# Patient Record
Sex: MALE | Employment: OTHER | URBAN - METROPOLITAN AREA
[De-identification: names, ages, dates, MRNs, and addresses within clinical notes are randomized per-mention and may not be internally consistent; named-entity substitution may affect disease eponyms.]

---

## 2019-06-11 ENCOUNTER — COMMUNICATION - HEALTHEAST (OUTPATIENT)
Dept: HEALTH INFORMATION MANAGEMENT | Facility: CLINIC | Age: 69
End: 2019-06-11

## 2021-06-19 NOTE — LETTER
Letter by Paddy Ugarte at      Author: Padyd Ugarte Service: -- Author Type: --    Filed:  Encounter Date: 2019 Status: (Other)         2019       Justin Christian Ramona  [address] [facility] [place] [address]    Dear Justin Greene:    We are pleased to provide you with secure, online access to medical information for you and your family. Per your request, we have expanded your account to allow access to the records of the following family members:              Jose J GENAO Ramona (privilege ends on 2026.)     How Do I Login?  1. In your Internet browser, go to [email or web address]  2. Click on Sign Up Now   3. Enter your Bia Activation Code exactly as it appears below. This code will  14 days after it is generated. You will not need to use this code after you have completed the sign-up process. If you do not sign up before the expiration date, you must request a new code.     Bia Activation Code: [email or web address]  Expires: 8/10/2019  3:58 PM    4. Enter in your date of birth and zip code.  5. Create a Bia username. Think of one that is secure and easy to remember.  Your username must be between 6 and 20 characters.  6. Create a Bia password. You can change your password at any time. Your password must be between 8 and 45 characters, contain at least two letters and one number, and contain both upper and lower case letters.  7. Choose a security question, enter your answer, and click Next. This can be used to access Bia if you forget your password.   8. Enter a valid e-mail address to receive e-mail notifications when new information is available in Bia.  9. Click Sign In.        How Do I Access a Family Member's Account?  10. Select the account you want to access by clicking the [place] with the appropriate patient's name at the top of your screen.   11. You will see a disclaimer page letting you know that you will be viewing a family member's  record. Review the disclaimer and then click Accept Proxy Access Disclaimer to proceed.  12. Once you switch to viewing a family member's record, you can navigate to [employer] pages the same way you would for yourself. You can return to your own account by clicking the [place] at the top of the screen with your name on it.    13. To customize colors and names of the linked accounts, you can select Personalize from the Profile dropdown menu at the top of the screen, then click the Edit button to make changes.      Additional Information  If you have questions, you can e-mail [email or web address] or call [phone] to talk to our [facility] [employer] staff. Remember, [employer] is NOT to be used for urgent needs. For medical emergencies, dial 911.

## 2022-08-05 ENCOUNTER — TRANSFERRED RECORDS (OUTPATIENT)
Dept: HEALTH INFORMATION MANAGEMENT | Facility: CLINIC | Age: 72
End: 2022-08-05

## 2022-08-06 ENCOUNTER — TRANSFERRED RECORDS (OUTPATIENT)
Dept: HEALTH INFORMATION MANAGEMENT | Facility: CLINIC | Age: 72
End: 2022-08-06

## 2022-08-07 ENCOUNTER — TRANSFERRED RECORDS (OUTPATIENT)
Dept: HEALTH INFORMATION MANAGEMENT | Facility: CLINIC | Age: 72
End: 2022-08-07

## 2022-08-11 ENCOUNTER — TRANSFERRED RECORDS (OUTPATIENT)
Dept: HEALTH INFORMATION MANAGEMENT | Facility: CLINIC | Age: 72
End: 2022-08-11

## 2022-09-21 ENCOUNTER — TELEPHONE (OUTPATIENT)
Dept: CARDIOLOGY | Facility: CLINIC | Age: 72
End: 2022-09-21

## 2022-09-21 NOTE — TELEPHONE ENCOUNTER
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  Please call Dr Rahumehra back with an answer from Dr Sam     Action Taken: Other: Cardiology    Travel Screening: Not Applicable

## 2022-09-23 NOTE — TELEPHONE ENCOUNTER
[facility]    Phone Message    May a detailed message be left on voicemail: yes     Reason for Call: Other: Dr. Malcolm calling back to check in on his message from 9/21. Please call him back with an update as soon as possible, thanks!    Action Taken: Other: Cardiology    Travel Screening: Not Applicable

## 2022-09-29 NOTE — TELEPHONE ENCOUNTER
[facility]  Caller: Unspecified (1 week ago)  Hi All,     A quick update     Patient called back stating he will be out of town on 10/12 and unable to make that appointment. Patient has been worked in to Dr. Sam's Research day on 10/27 at 3:50pm.     Thanks!   [place]

## 2022-10-27 ENCOUNTER — OFFICE VISIT (OUTPATIENT)
Dept: CARDIOLOGY | Facility: CLINIC | Age: 72
End: 2022-10-27
Payer: COMMERCIAL

## 2022-10-27 VITALS
WEIGHT: 189.4 LBS | HEART RATE: 61 BPM | DIASTOLIC BLOOD PRESSURE: 68 MMHG | HEIGHT: 70 IN | SYSTOLIC BLOOD PRESSURE: 108 MMHG | RESPIRATION RATE: 16 BRPM | BODY MASS INDEX: 27.11 KG/M2

## 2022-10-27 DIAGNOSIS — G47.33 MODERATE OBSTRUCTIVE SLEEP APNEA: ICD-10-CM

## 2022-10-27 DIAGNOSIS — I44.2 COMPLETE HEART BLOCK (H): ICD-10-CM

## 2022-10-27 DIAGNOSIS — I48.0 PAROXYSMAL ATRIAL FIBRILLATION (H): ICD-10-CM

## 2022-10-27 PROBLEM — I25.10 CORONARY ARTERY CALCIFICATION: Status: ACTIVE | Noted: 2022-06-29

## 2022-10-27 PROBLEM — A69.20 DISSEMINATED LYME DISEASE: Status: ACTIVE | Noted: 2022-08-07

## 2022-10-27 PROBLEM — I48.91 ATRIAL FIBRILLATION WITH SLOW VENTRICULAR RESPONSE (H): Status: ACTIVE | Noted: 2021-06-28

## 2022-10-27 PROBLEM — E78.49 OTHER HYPERLIPIDEMIA: Status: ACTIVE | Noted: 2022-10-27

## 2022-10-27 PROCEDURE — 99205 OFFICE O/P NEW HI 60 MIN: CPT | Performed by: INTERNAL MEDICINE

## 2022-10-27 RX ORDER — ERGOCALCIFEROL 1.25 MG/1
50000 CAPSULE, LIQUID FILLED ORAL WEEKLY
COMMUNITY
End: 2023-02-15

## 2022-10-27 RX ORDER — ATORVASTATIN CALCIUM 40 MG/1
40 TABLET, FILM COATED ORAL DAILY
COMMUNITY
Start: 2022-03-18

## 2022-10-27 NOTE — LETTER
10/27/2022    Yasmani Martino  [facility] [address] [facility] [address]    RE: Justin Greene       Dear Colleague,     I had the pleasure of seeing Justin Greene in the [facility].    [facility]  Cardiac Electrophysiology     Consultation Note: Robel Sam MD    Primary Care: Yasmani Martino MD      Thank you, Dr. Martino, for asking the [facility] Cardiac Arrhythmia care team to see Mr. Justin Greene to evaluate treatment options for paroxysmal atrial fibrillation.    Assessment/Recommendations   Assessment:      Paroxysmal atrial fibrillation: The patient clearly has symptoms associated with his atrial arrhythmia.  He has had improvement in sinus rhythm but probably has some early sinus node dysfunction and tachycardia-bradycardia syndrome based on his response to sotalol therapy and its withdrawal.  Its difficult to know whether his regularized bradycardia and atrial fibrillation in early August was secondary to Lyme's disease or in combination with his antiarrhythmic drug therapy or possible intrinsic conduction system disease.  Nonetheless this appears to have resolved and his heart rate response to activity has normalized (off antiarrhythmic drug therapy).  We discussed the underlying pathophysiology of his arrhythmia condition as well as treatment options including medical therapy and mapping/ablation.  I do not think he is a good candidate for daily antiarrhythmic drug therapy, but he would be a potential candidate for a pill in the pocket approach to treating episodes of recurrent atrial fibrillation.  This approach would permit us to better understand the frequency of his arrhythmia recurrence and permit further discussion about the alternative treatment options such as mapping and ablation.  The patient has had a recent stress test which showed no evidence of significant coronary ischemia and he would be a candidate for  "utilizing flecainide in combination with very low-dose beta-blocker.  The patient is wife will consider this potential approach as well as decide whether or not the wish to pursue arrhythmia care through the [facility] system or return to the [facility].    Obstructive sleep apnea: Chronic diagnosis for the patient and he is compliant with the CPAP therapy.  Plan:    I have asked the patient to check his rhythm status using his Apple Watch each morning.  In addition he can check if he feels that his heart rate response or symptoms warrant further assessment.    The patient will be contacted by the EP nurse clinician next week to see how he would like to proceed with his arrhythmia management.  If he does wish to follow-up with the [facility] system then I would start him on a pill in the pocket utilizing flecainide 50 mg at onset and metoprolol tartrate 12.5 mg.  This could be repeated at 4 hours if his episodes persist.    If the patient does wish to follow-up with [facility] then I would asked that he be seen in the arrhythmia clinic by the EP [facility] in 3 months.                  History of Present Illness/Subjective      Mr. Justin Greene is a 72 year old male with documented atrial fibrillation.  The patient symptoms date to July 2021 and he did have a cardioversion in August 2021.  Subsequently this last summer he began to \"struggle with stamina\" after a illness in late July which he thinks may have been secondary to a tick bite earlier in the month.  His symptoms progressed and he was hospitalized in early August with atrial fibrillation and a regularized ventricular response consistent with possible heart block.  At that time the patient was treated for Lyme's disease with doxycycline (of note the patient was on sotalol at that time) and following the initiation of antibiotic treatment and stopping sotalol the patient's AV conduction returned and he spontaneously converted back to sinus rhythm " approximately 3 days later.  Since that time he has been off membrane active antiarrhythmic drug therapy and notes that overall his energy is improved and that his heart rate response to activities likewise has improved with exercise rates in the 130-140 bpm range.  He reports no exertional chest pain or pressure.  He is not experiencing any orthopnea, PND or ankle edema.  He has been diagnosed with obstructive sleep apnea and is compliant with his CPAP therapy..      ECG:   Reported in Care Everywhere  Sinus bam 55 bpm.    ECHO:   Dated: 8/6/2022 ([facility])  1. Bradyarrhythmia during study.     2. The left ventricular size is normal.  Systolic function is normal.     The   estimated ejection fraction is 65%.  Wall thickness is borderline   increased.   Left ventricular segmental wall motion is grossly normal, however   endocardial   definition is limited.     3. The right ventricular size is borderline mildly enlarged.  Systolic   function is normal.     4. The left atrium is mildly enlarged.     5. The right atrium is mildly enlarged.     6. There is mild mitral valve regurgitation.     7. There is mild tricuspid valve regurgitation.     8. There is trace pulmonic regurgitation.     9. Aorta is normal in dimension proximally.     10. There is no gross pericardial effusion.       Other Studies:   Nuclear Stress Imaging study ([facility]) 7/20/2021  Summary     1. Treadmill Myocard Perf Multiple SPECT w/ or w/o wall motion \T\ EF   [[id number]].     2. Resting SPECT images and Stress Gated SPECT images obtained post   Sestamibi injection.     3. The patient exercised for 11 minutes and 0 seconds on the  [place]   protocol.     4. The patient experienced no symptoms during the stress test.     5. Excellent functional capacity for age.     6. Normal Hemodynamic response to stress.     7. Occasional PVCs during stress and rare Couplets noted.     8. Negative stress ECG for significant ST segment depression.  "    9. Stress and rest images show count reduction inferiorly consistent with   diaphragmatic attenuation.     10. Perfusion images reveal no significant territory of myocardial   infarct   or ischemia.     11. Normal left ventricular systolic function. Calculated LVEF 65 %.     Time spent: 65 minutes spent on the date of the encounter doing chart review, history and exam, documentation and further activities as noted above.       Physical Examination Review of Systems   /68 (BP Location: Right arm, Patient Position: Sitting, Cuff Size: Adult Regular)   Pulse 61   Resp 16   Ht 1.778 m (5' 10\")   Wt 85.9 kg (189 lb 6.4 oz)   BMI 27.18 kg/m    Body mass index is 27.18 kg/m .  Wt Readings from Last 3 Encounters:   10/27/22 85.9 kg (189 lb 6.4 oz)     [unfilled]    General     Appearance:   The patient is alert oriented to person place and situation.    The patient is in no acute distress at the time of my evaluation.   HEENT:  Pupils are equal, round, and reactive to light.  Conjunctiva and sclera are clear.  ENT: Oral mucosa is moist and without redness. No evident nasal discharge.   Neck: Without palpable thyroid or appreciable lymph nodes.   Chest: Symmetric, without deformity.   Lungs:   Clear bilaterally with no rales, rhonchi, or wheezes.     Cardiovascular:   Rhythm is regular. S1 and S2 are normal. No significant murmur is present. JVP is normal. Lower extremities demonstrate no significant edema. Distal pulses are intact bilaterally.   Abdomen:  Soft, non-tender, and without hepatosplenomegaly. Bowel sounds present.   Extremities: Without deformity.   Skin: Skin is warm, dry, and otherwise intact.   Neurologic: Gait is normal.           A 12 point comprehensive review of systems was negative except as noted.      Medical History  Surgical History Family History Social History   Past Medical History:   Diagnosis Date     Dyslipidemia     Created by Conversion      Moderate obstructive sleep apnea " 10/30/2013    PSG: 10/29/13 - RDI 24.7, AHI 18.8, REM RDI 19.8, and low oxygen 83%. Titration: 6/2/16 - Set 8 cmH20 optimal. 9/8/20: AHI at goal at 1.3. Good compliance. Set 10 cmH20.     Paroxysmal atrial fibrillation (H)     Past Surgical History:   Procedure Laterality Date     SPINE SURGERY N/A     Family History   Problem Relation Age of Onset     Lung Cancer Mother      Cerebrovascular Disease Father      Myocardial Infarction Father      Prostate Cancer Father      Thyroid Cancer Sister      No Known Problems Sister      Sudden Death Brother      Hypertension Brother      Congenital heart disease Brother      Coronary Artery Disease Brother      Prader-Willi syndrome Brother      No Known Problems Brother     Social History     Socioeconomic History     Marital status:      Spouse name: Not on file     Number of children: Not on file     Years of education: Not on file     Highest education level: Not on file   Occupational History     Not on file   Tobacco Use     Smoking status: Never     Smokeless tobacco: Never   Substance and Sexual Activity     Alcohol use: Never     Drug use: Never     Sexual activity: Not on file   Other Topics Concern     Not on file   Social History Narrative     Not on file     Social Determinants of Health     Financial Resource Strain: Not on file   Food Insecurity: Not on file   Transportation Needs: Not on file   Physical Activity: Not on file   Stress: Not on file   Social Connections: Not on file   Intimate Partner Violence: Not on file   Housing Stability: Not on file          Medications  Allergies   Scheduled Meds:  Current Outpatient Medications   Medication Sig Dispense Refill     atorvastatin (LIPITOR) 40 MG tablet Take 40 mg by mouth daily       cholecalciferol 50 MCG (2000 UT) tablet Take 4,000 Units by mouth       Melatonin-Pyridoxine (MELATONIN-B6 OR)        rivaroxaban ANTICOAGULANT (XARELTO) 20 MG TABS tablet Take 20 mg by mouth daily       vitamin D2  (ERGOCALCIFEROL) 14452 units (1250 mcg) capsule Take 50,000 Units by mouth once a week      No Known Allergies      Lab Results    Chemistry/lipid CBC Cardiac Enzymes/BNP/TSH/INR   No results found for: CHOL, HDL, TRIG, CHOLHDL, CREATININE, BUN, NA, CO2 No results found for: WBC, HGB, HCT, MCV, PLT No results found for: CKTOTAL, CKMB, TROPONINI, BNP, TSH, INR      Robel Sam MD  Clinical Cardiac Electrophysiologist  [facility]      Thank you for allowing me to participate in the care of your patient.      Sincerely,     Robel Sam MD     [facility]  cc:   No referring provider defined for this encounter.

## 2022-10-28 PROBLEM — I48.0 PAROXYSMAL ATRIAL FIBRILLATION (H): Status: ACTIVE | Noted: 2021-06-28

## 2022-10-28 NOTE — PROGRESS NOTES
[facility]  Cardiac Electrophysiology     Consultation Note: Robel Sam MD    Primary Care: Yasmani Martino MD      Thank you, Dr. Martino, for asking the [facility] Cardiac Arrhythmia care team to see Mr. Justin Greene to evaluate treatment options for paroxysmal atrial fibrillation.    Assessment/Recommendations   Assessment:      Paroxysmal atrial fibrillation: The patient clearly has symptoms associated with his atrial arrhythmia.  He has had improvement in sinus rhythm but probably has some early sinus node dysfunction and tachycardia-bradycardia syndrome based on his response to sotalol therapy and its withdrawal.  Its difficult to know whether his regularized bradycardia and atrial fibrillation in early August was secondary to Lyme's disease or in combination with his antiarrhythmic drug therapy or possible intrinsic conduction system disease.  Nonetheless this appears to have resolved and his heart rate response to activity has normalized (off antiarrhythmic drug therapy).  We discussed the underlying pathophysiology of his arrhythmia condition as well as treatment options including medical therapy and mapping/ablation.  I do not think he is a good candidate for daily antiarrhythmic drug therapy, but he would be a potential candidate for a pill in the pocket approach to treating episodes of recurrent atrial fibrillation.  This approach would permit us to better understand the frequency of his arrhythmia recurrence and permit further discussion about the alternative treatment options such as mapping and ablation.  The patient has had a recent stress test which showed no evidence of significant coronary ischemia and he would be a candidate for utilizing flecainide in combination with very low-dose beta-blocker.  The patient is wife will consider this potential approach as well as decide whether or not the wish to pursue arrhythmia care through the [place] system or return  "to the [facility] system.    Obstructive sleep apnea: Chronic diagnosis for the patient and he is compliant with the CPAP therapy.  Plan:    I have asked the patient to check his rhythm status using his Apple Watch each morning.  In addition he can check if he feels that his heart rate response or symptoms warrant further assessment.    The patient will be contacted by the EP nurse clinician next week to see how he would like to proceed with his arrhythmia management.  If he does wish to follow-up with the [facility] system then I would start him on a pill in the pocket utilizing flecainide 50 mg at onset and metoprolol tartrate 12.5 mg.  This could be repeated at 4 hours if his episodes persist.    If the patient does wish to follow-up with [facility] then I would asked that he be seen in the arrhythmia clinic by the EP ENOCH in 3 months.                  History of Present Illness/Subjective      Mr. Justin Greene is a 72 year old male with documented atrial fibrillation.  The patient symptoms date to July 2021 and he did have a cardioversion in August 2021.  Subsequently this last summer he began to \"struggle with stamina\" after a illness in late July which he thinks may have been secondary to a tick bite earlier in the month.  His symptoms progressed and he was hospitalized in early August with atrial fibrillation and a regularized ventricular response consistent with possible heart block.  At that time the patient was treated for Lyme's disease with doxycycline (of note the patient was on sotalol at that time) and following the initiation of antibiotic treatment and stopping sotalol the patient's AV conduction returned and he spontaneously converted back to sinus rhythm approximately 3 days later.  Since that time he has been off membrane active antiarrhythmic drug therapy and notes that overall his energy is improved and that his heart rate response to activities likewise has improved with exercise " rates in the 130-140 bpm range.  He reports no exertional chest pain or pressure.  He is not experiencing any orthopnea, PND or ankle edema.  He has been diagnosed with obstructive sleep apnea and is compliant with his CPAP therapy..      ECG:   Reported in Care Everywhere  Sinus bam 55 bpm.    ECHO:   Dated: 8/6/2022 ([facility])  1. Bradyarrhythmia during study.     2. The left ventricular size is normal.  Systolic function is normal.     The   estimated ejection fraction is 65%.  Wall thickness is borderline   increased.   Left ventricular segmental wall motion is grossly normal, however   endocardial   definition is limited.     3. The right ventricular size is borderline mildly enlarged.  Systolic   function is normal.     4. The left atrium is mildly enlarged.     5. The right atrium is mildly enlarged.     6. There is mild mitral valve regurgitation.     7. There is mild tricuspid valve regurgitation.     8. There is trace pulmonic regurgitation.     9. Aorta is normal in dimension proximally.     10. There is no gross pericardial effusion.       Other Studies:   Nuclear Stress Imaging study ([facility]) 7/20/2021  Summary     1. Treadmill Myocard Perf Multiple SPECT w/ or w/o wall motion \T\ EF   [[id number]].     2. Resting SPECT images and Stress Gated SPECT images obtained post   Sestamibi injection.     3. The patient exercised for 11 minutes and 0 seconds on the  [facility]   protocol.     4. The patient experienced no symptoms during the stress test.     5. Excellent functional capacity for age.     6. Normal Hemodynamic response to stress.     7. Occasional PVCs during stress and rare Couplets noted.     8. Negative stress ECG for significant ST segment depression.     9. Stress and rest images show count reduction inferiorly consistent with   diaphragmatic attenuation.     10. Perfusion images reveal no significant territory of myocardial   infarct   or ischemia.     11. Normal left ventricular  "systolic function. Calculated LVEF 65 %.     Time spent: 65 minutes spent on the date of the encounter doing chart review, history and exam, documentation and further activities as noted above.       Physical Examination Review of Systems   /68 (BP Location: Right arm, Patient Position: Sitting, Cuff Size: Adult Regular)   Pulse 61   Resp 16   Ht 1.778 m (5' 10\")   Wt 85.9 kg (189 lb 6.4 oz)   BMI 27.18 kg/m    Body mass index is 27.18 kg/m .  Wt Readings from Last 3 Encounters:   10/27/22 85.9 kg (189 lb 6.4 oz)     [unfilled]    General     Appearance:   The patient is alert oriented to person place and situation.    The patient is in no acute distress at the time of my evaluation.   HEENT:  Pupils are equal, round, and reactive to light.  Conjunctiva and sclera are clear.  ENT: Oral mucosa is moist and without redness. No evident nasal discharge.   Neck: Without palpable thyroid or appreciable lymph nodes.   Chest: Symmetric, without deformity.   Lungs:   Clear bilaterally with no rales, rhonchi, or wheezes.     Cardiovascular:   Rhythm is regular. S1 and S2 are normal. No significant murmur is present. JVP is normal. Lower extremities demonstrate no significant edema. Distal pulses are intact bilaterally.   Abdomen:  Soft, non-tender, and without hepatosplenomegaly. Bowel sounds present.   Extremities: Without deformity.   Skin: Skin is warm, dry, and otherwise intact.   Neurologic: Gait is normal.           A 12 point comprehensive review of systems was negative except as noted.      Medical History  Surgical History Family History Social History   Past Medical History:   Diagnosis Date     Dyslipidemia     Created by Conversion      Moderate obstructive sleep apnea 10/30/2013    PSG: 10/29/13 - RDI 24.7, AHI 18.8, REM RDI 19.8, and low oxygen 83%. Titration: 6/2/16 - Set 8 cmH20 optimal. 9/8/20: AHI at goal at 1.3. Good compliance. Set 10 cmH20.     Paroxysmal atrial fibrillation (H)     Past " Surgical History:   Procedure Laterality Date     SPINE SURGERY N/A     Family History   Problem Relation Age of Onset     Lung Cancer Mother      Cerebrovascular Disease Father      Myocardial Infarction Father      Prostate Cancer Father      Thyroid Cancer Sister      No Known Problems Sister      Sudden Death Brother      Hypertension Brother      Congenital heart disease Brother      Coronary Artery Disease Brother      Prader-Willi syndrome Brother      No Known Problems Brother     Social History     Socioeconomic History     Marital status:      Spouse name: Not on file     Number of children: Not on file     Years of education: Not on file     Highest education level: Not on file   Occupational History     Not on file   Tobacco Use     Smoking status: Never     Smokeless tobacco: Never   Substance and Sexual Activity     Alcohol use: Never     Drug use: Never     Sexual activity: Not on file   Other Topics Concern     Not on file   Social History Narrative     Not on file     Social Determinants of Health     Financial Resource Strain: Not on file   Food Insecurity: Not on file   Transportation Needs: Not on file   Physical Activity: Not on file   Stress: Not on file   Social Connections: Not on file   Intimate Partner Violence: Not on file   Housing Stability: Not on file          Medications  Allergies   Scheduled Meds:  Current Outpatient Medications   Medication Sig Dispense Refill     atorvastatin (LIPITOR) 40 MG tablet Take 40 mg by mouth daily       cholecalciferol 50 MCG (2000 UT) tablet Take 4,000 Units by mouth       Melatonin-Pyridoxine (MELATONIN-B6 OR)        rivaroxaban ANTICOAGULANT (XARELTO) 20 MG TABS tablet Take 20 mg by mouth daily       vitamin D2 (ERGOCALCIFEROL) 47517 units (1250 mcg) capsule Take 50,000 Units by mouth once a week      No Known Allergies      Lab Results    Chemistry/lipid CBC Cardiac Enzymes/BNP/TSH/INR   No results found for: CHOL, HDL, TRIG, CHOLHDL,  CREATININE, BUN, NA, CO2 No results found for: WBC, HGB, HCT, MCV, PLT No results found for: CKTOTAL, CKMB, TROPONINI, BNP, TSH, INR      Robel Sam MD  Clinical Cardiac Electrophysiologist  [facility]

## 2022-11-01 ENCOUNTER — TELEPHONE (OUTPATIENT)
Dept: CARDIOLOGY | Facility: CLINIC | Age: 72
End: 2022-11-01

## 2022-11-01 DIAGNOSIS — I48.0 PAROXYSMAL ATRIAL FIBRILLATION (H): Primary | ICD-10-CM

## 2022-11-01 NOTE — TELEPHONE ENCOUNTER
1st Attempt to contact pt, voicemail message was left with contact information and instructing pt to call back.  11/1/2022 8:46 AM  Merlyn Brunson RN        ----- Message from Robel Sam MD sent at 10/31/2022  7:06 PM CDT -----  Hi team,  I apologize if this is a duplicate message.  Please see my note from last Thursday.  Can 1 of you please contact the patient this week to see if he wishes to move forward with follow-up with our clinic.  If yes, then we can start him on the flecainide + metoprolol as outlined in my note.  He would then follow-up with the EP [facility] as outlined.  Thanks  Robel MATOS with Dr Sam 10/27:  Plan:    I have asked the patient to check his rhythm status using his Apple Watch each morning.  In addition he can check if he feels that his heart rate response or symptoms warrant further assessment.    The patient will be contacted by the EP nurse clinician next week to see how he would like to proceed with his arrhythmia management.  If he does wish to follow-up with the [facility] system then I would start him on a pill in the pocket utilizing flecainide 50 mg at onset and metoprolol tartrate 12.5 mg.  This could be repeated at 4 hours if his episodes persist.    If the patient does wish to follow-up with [facility] then I would asked that he be seen in the arrhythmia clinic by the EP ENOCH in 3 months.

## 2022-11-04 RX ORDER — FLECAINIDE ACETATE 50 MG/1
50 TABLET ORAL PRN
Qty: 30 TABLET | Refills: 3 | Status: SHIPPED | OUTPATIENT
Start: 2022-11-04 | End: 2023-03-14

## 2022-11-04 RX ORDER — METOPROLOL TARTRATE 25 MG/1
12.5 TABLET, FILM COATED ORAL PRN
Qty: 30 TABLET | Refills: 3 | Status: SHIPPED | OUTPATIENT
Start: 2022-11-04 | End: 2023-03-09

## 2022-11-04 NOTE — TELEPHONE ENCOUNTER
PC back from pt  Pt states that he wants to follow-up with our EP group  He understands and agreed to the medication instructions below, and follow-up  I did review with pt to keep track of AF episodes, to call if increase in frequency or duration, or if pt is in AF >6-8 hrs after taking 2 doses of medication  Pt verbalized understanding, has no further questions or concerns at this time, and has our contact information if needed.  11/4/2022 10:38 AM  Merlyn Brunson RN

## 2022-11-04 NOTE — TELEPHONE ENCOUNTER
2nd Attempt to contact pt, voicemail message was left with contact information and instructing pt to call back.  11/4/2022 10:29 AM  Merlyn Cisneros RN

## 2023-02-11 ENCOUNTER — HEALTH MAINTENANCE LETTER (OUTPATIENT)
Age: 73
End: 2023-02-11

## 2023-02-15 ENCOUNTER — OFFICE VISIT (OUTPATIENT)
Dept: CARDIOLOGY | Facility: CLINIC | Age: 73
End: 2023-02-15
Attending: NURSE PRACTITIONER
Payer: COMMERCIAL

## 2023-02-15 VITALS
WEIGHT: 196 LBS | BODY MASS INDEX: 28.06 KG/M2 | SYSTOLIC BLOOD PRESSURE: 124 MMHG | HEART RATE: 57 BPM | HEIGHT: 70 IN | DIASTOLIC BLOOD PRESSURE: 82 MMHG | RESPIRATION RATE: 16 BRPM

## 2023-02-15 DIAGNOSIS — I44.2 COMPLETE HEART BLOCK (H): ICD-10-CM

## 2023-02-15 DIAGNOSIS — I48.0 PAROXYSMAL ATRIAL FIBRILLATION (H): Primary | ICD-10-CM

## 2023-02-15 DIAGNOSIS — G47.33 MODERATE OBSTRUCTIVE SLEEP APNEA: ICD-10-CM

## 2023-02-15 DIAGNOSIS — I25.10 CORONARY ARTERY CALCIFICATION: ICD-10-CM

## 2023-02-15 PROCEDURE — 99215 OFFICE O/P EST HI 40 MIN: CPT | Performed by: NURSE PRACTITIONER

## 2023-02-15 RX ORDER — ASPIRIN 81 MG/1
81 TABLET, CHEWABLE ORAL DAILY
COMMUNITY
End: 2023-03-09

## 2023-02-15 RX ORDER — MULTIVIT WITH MINERALS/LUTEIN
1000 TABLET ORAL DAILY
COMMUNITY

## 2023-02-15 NOTE — PROGRESS NOTES
HEART CARE ELECTROPHYSIOLOGY NOTE      [facility]  [phone]      Assessment/Recommendations   Assessment/Plan:  1.  Persistent Atrial Fibrillation: Initial episode of A-fib in 2021 required cardioversion, though subsequent episodes have spontaneously converted back to sinus rhythm.  Failed antiarrhythmic therapy with low-dose sotalol due to symptomatic bradycardia and AV block.  Due to degree of sinus node dysfunction and possible history of high-grade AV block, he is not a good candidate for daily membrane active antiarrhythmic medications.  We further discussed pulmonary vein isolation ablation utilizing radiofrequency or cryo and <1 to 2% risk for complication.  He was given information to review at home.  We will continue to use flecainide pill in the pocket until he has more frequent occurrences of AF, then recommend proceeding with ablation.  Take flecainide 50 mg with metoprolol tartrate 12.5 mg at onset of A-fib.  May repeat in 4 hours if needed.  He is to call if episodes of AF increase in frequency or duration.    He was reassured that atrial fibrillation is not life-threatening, but carries an increased risk for stroke.  He has a [id number] score of 1-2 for age 65-74 and coronary artery calcification seen on CT, though negative stress test.  We discussed risk for stroke versus risk for bleed on OAC.  As he is very symptomatic, monitors his rhythm regularly, and episodes are infrequent, he will remain on aspirin 81 mg daily.  He was instructed to restart Xarelto if AF lasts longer than 6 to 12 hours.  We also discussed need for anticoagulation before and after ablation.    2.  Coronary artery calcification seen on CT: Stress test in 2021 was negative for ischemia.  He exercises regularly with interval cycling without difficulty.  Denies anginal symptoms.  Continue statin.    3.  Obstructive sleep apnea: Consistent use of CPAP nightly.  Discussed correlation between untreated  sleep apnea and atrial arrhythmias.  He was encouraged to maintain compliance with therapy.    Follow up in 1 year     History of Present Illness/Subjective    HPI: Justin Greene is a 72 year old male who comes in today accompanied by his wife for EP follow-up of atrial fibrillation.  He has a history of atrial fibrillation, sinus node dysfunction, hyperlipidemia, and LISET on CPAP.  Chest CT from 2016 notes coronary artery calcifications, stress test done in 2021 was negative for ischemia.    He was diagnosed with atrial fibrillation in the summer 2021 undergoing cardioversion in August 2021, though symptom onset in July.  Symptoms consist of fatigue, decreased activity tolerance, and orthostatic lightheadedness.  He was started on sotalol in September 2021.  He was hospitalized in August 2022 for atrial fibrillation with slow ventricular response due to possible complete heart block and Lyme's disease/Anaplasma treated with doxycycline.  Initial EKG showed atrial fibrillation with a ventricular response in the 40s and 50s with a regular wide QRS complex (LBBB morphology) suggestive of complete heart block with ventricular escape rhythm.  AV conduction returned to normal with discontinuation of sotalol and he spontaneously converted back to sinus rhythm.  He has remained off membrane active antiarrhythmic medications with improved heart rates/chronotropic response to activity.  He has flecainide and metoprolol tartrate to use at onset of A-fib, but has not needed them.  He stopped taking Xarelto after maintaining sinus rhythm for 1 month and started low-dose daily aspirin.    Gino states that he feels well.  He is very active and exercises routinely.  He checks his pulse at least once a day and also does an EKG with his Apple watch.  He has not had any recurrent A-fib.  He denies chest discomfort, palpitations, abdominal fullness/bloating or peripheral edema, shortness of breath, paroxysmal nocturnal dyspnea,  orthopnea, lightheadedness, dizziness, pre-syncope, or syncope.    Cardiographics:  EKG done 8/11/2022 states sinus bradycardia at 55 bpm,  ms, QT/QTc 448/428 ms  EKG done 8/4/2022 states atrial fibrillation with controlled ventricular rate of 64 bpm    ECHO done 8/6/2022:   1. Bradyarrhythmia during study.     2. The left ventricular size is normal.  Systolic function is normal.     The   estimated ejection fraction is 65%.  Wall thickness is borderline   increased.   Left ventricular segmental wall motion is grossly normal, however   endocardial   definition is limited.     3. The right ventricular size is borderline mildly enlarged.  Systolic   function is normal.     4. The left atrium is mildly enlarged.     5. The right atrium is mildly enlarged.     6. There is mild mitral valve regurgitation.     7. There is mild tricuspid valve regurgitation.     8. There is trace pulmonic regurgitation.     9. Aorta is normal in dimension proximally.     10. There is no gross pericardial effusion.     NM exercise stress test done 7/20/2021:    1. Treadmill Myocard Perf Multiple SPECT w/ or w/o wall motion \T\ EF   [[id number]].     2. Resting SPECT images and Stress Gated SPECT images obtained post   Sestamibi injection.     3. The patient exercised for 11 minutes and 0 seconds on the  [facility]   protocol.     4. The patient experienced no symptoms during the stress test.     5. Excellent functional capacity for age.     6. Normal Hemodynamic response to stress.     7. Occasional PVCs during stress and rare Couplets noted.     8. Negative stress ECG for significant ST segment depression.     9. Stress and rest images show count reduction inferiorly consistent with   diaphragmatic attenuation.     10. Perfusion images reveal no significant territory of myocardial   infarct   or ischemia.     11. Normal left ventricular systolic function. Calculated LVEF 65 %.       I have reviewed and updated the patient's Past Medical  "History, Social History, Family History and Medication List.  Outside records personally reviewed.     Physical Examination  Review of Systems   Vitals: /82 (BP Location: Right arm, Patient Position: Sitting, Cuff Size: Adult Large)   Pulse 57   Resp 16   Ht 1.778 m (5' 10\")   Wt 88.9 kg (196 lb)   BMI 28.12 kg/m    BMI= Body mass index is 28.12 kg/m .  Wt Readings from Last 3 Encounters:   02/15/23 88.9 kg (196 lb)   10/27/22 85.9 kg (189 lb 6.4 oz)       General Appearance:   Alert, well-appearing and in no acute distress.   HEENT: Atraumatic, normocephalic.  No scleral icterus, normal conjunctivae, EOMs intact, PERRL.  Wearing a mask.   Chest/Lungs:   Chest symmetric, spine straight.  Respirations unlabored.  Lungs are clear to auscultation.   Cardiovascular:   Regular rate and rhythm.  Normal first and second heart sounds with no murmurs, rubs, or gallops; radial and posterior tibial pulses are intact, No edema.   Abdomen:  Soft, nondistended, bowel sounds present.   Extremities: No cyanosis or clubbing.   Musculoskeletal: Moves all extremities.    Skin: Warm, dry, intact.    Neurologic: Mood and affect are appropriate.  Alert and oriented to person, place, time, and situation.     ROS: 10 point ROS neg other than the symptoms noted above in the HPI.         Medical History  Surgical History Family History Social History   Past Medical History:   Diagnosis Date     Dyslipidemia     Created by Conversion      Moderate obstructive sleep apnea 10/30/2013    PSG: 10/29/13 - RDI 24.7, AHI 18.8, REM RDI 19.8, and low oxygen 83%. Titration: 6/2/16 - Set 8 cmH20 optimal. 9/8/20: AHI at goal at 1.3. Good compliance. Set 10 cmH20.     Paroxysmal atrial fibrillation (H)      Past Surgical History:   Procedure Laterality Date     SPINE SURGERY N/A      Family History   Problem Relation Age of Onset     Lung Cancer Mother      Cerebrovascular Disease Father      Myocardial Infarction Father      Prostate Cancer " Father      Thyroid Cancer Sister      No Known Problems Sister      Sudden Death Brother      Hypertension Brother      Congenital heart disease Brother      Coronary Artery Disease Brother      Prader-Willi syndrome Brother      No Known Problems Brother         Social History     Socioeconomic History     Marital status:      Spouse name: Not on file     Number of children: Not on file     Years of education: Not on file     Highest education level: Not on file   Occupational History     Not on file   Tobacco Use     Smoking status: Never     Smokeless tobacco: Never   Substance and Sexual Activity     Alcohol use: Never     Drug use: Never     Sexual activity: Not on file   Other Topics Concern     Not on file   Social History Narrative     Not on file     Social Determinants of Health     Financial Resource Strain: Not on file   Food Insecurity: Not on file   Transportation Needs: Not on file   Physical Activity: Not on file   Stress: Not on file   Social Connections: Not on file   Intimate Partner Violence: Not on file   Housing Stability: Not on file           Medications  Allergies   Current Outpatient Medications   Medication Sig Dispense Refill     aspirin (ASA) 81 MG chewable tablet Take 81 mg by mouth daily       atorvastatin (LIPITOR) 40 MG tablet Take 40 mg by mouth daily       cholecalciferol 50 MCG (2000 UT) tablet Take 4,000 Units by mouth       flecainide (TAMBOCOR) 50 MG tablet Take 1 tablet (50 mg) by mouth as needed (With onset of atrial fibrillation, repeat in 4 hours if still in atrial fibrillation.) 30 tablet 3     Melatonin-Pyridoxine (MELATONIN-B6 OR)        metoprolol tartrate (LOPRESSOR) 25 MG tablet Take 0.5 tablets (12.5 mg) by mouth as needed (Take with onset of atrial fibrillation, can repeat 4 hours after if still in atrial fibrillation) 30 tablet 3     vitamin E (TOCOPHEROL) 1000 units (450 mg) CAPS capsule Take 1,000 Units by mouth daily       No Known Allergies       Lab  Results    Chemistry/lipid CBC Cardiac Enzymes/BNP/TSH/INR   Outside labs reviewed           59 minutes were spent on the date of encounter performing chart review, history and exam, documentation, and further activities as noted above.

## 2023-02-15 NOTE — LETTER
2/15/2023    Yasmani BELTRAN Gunner  [facility] [address]  [facility] [address]    RE: Justin Greene       Dear Colleague,     I had the pleasure of seeing Justin Greene in the [facility].    HEART CARE ELECTROPHYSIOLOGY NOTE      [facility]  [phone]      Assessment/Recommendations   Assessment/Plan:  1.  Persistent Atrial Fibrillation: Initial episode of A-fib in 2021 required cardioversion, though subsequent episodes have spontaneously converted back to sinus rhythm.  Failed antiarrhythmic therapy with low-dose sotalol due to symptomatic bradycardia and AV block.  Due to degree of sinus node dysfunction and possible history of high-grade AV block, he is not a good candidate for daily membrane active antiarrhythmic medications.  We further discussed pulmonary vein isolation ablation utilizing radiofrequency or cryo and <1 to 2% risk for complication.  He was given information to review at home.  We will continue to use flecainide pill in the pocket until he has more frequent occurrences of AF, then recommend proceeding with ablation.  Take flecainide 50 mg with metoprolol tartrate 12.5 mg at onset of A-fib.  May repeat in 4 hours if needed.  He is to call if episodes of AF increase in frequency or duration.    He was reassured that atrial fibrillation is not life-threatening, but carries an increased risk for stroke.  He has a [id number] score of 1-2 for age 65-74 and coronary artery calcification seen on CT, though negative stress test.  We discussed risk for stroke versus risk for bleed on OAC.  As he is very symptomatic, monitors his rhythm regularly, and episodes are infrequent, he will remain on aspirin 81 mg daily.  He was instructed to restart Xarelto if AF lasts longer than 6 to 12 hours.  We also discussed need for anticoagulation before and after ablation.    2.  Coronary artery calcification seen on CT: Stress test in 2021 was negative for  ischemia.  He exercises regularly with interval cycling without difficulty.  Denies anginal symptoms.  Continue statin.    3.  Obstructive sleep apnea: Consistent use of CPAP nightly.  Discussed correlation between untreated sleep apnea and atrial arrhythmias.  He was encouraged to maintain compliance with therapy.    Follow up in 1 year     History of Present Illness/Subjective    HPI: Justin Greene is a 72 year old male who comes in today accompanied by his wife for EP follow-up of atrial fibrillation.  He has a history of atrial fibrillation, sinus node dysfunction, hyperlipidemia, and LISET on CPAP.  Chest CT from 2016 notes coronary artery calcifications, stress test done in 2021 was negative for ischemia.    He was diagnosed with atrial fibrillation in the summer 2021 undergoing cardioversion in August 2021, though symptom onset in July.  Symptoms consist of fatigue, decreased activity tolerance, and orthostatic lightheadedness.  He was started on sotalol in September 2021.  He was hospitalized in August 2022 for atrial fibrillation with slow ventricular response due to possible complete heart block and Lyme's disease/Anaplasma treated with doxycycline.  Initial EKG showed atrial fibrillation with a ventricular response in the 40s and 50s with a regular wide QRS complex (LBBB morphology) suggestive of complete heart block with ventricular escape rhythm.  AV conduction returned to normal with discontinuation of sotalol and he spontaneously converted back to sinus rhythm.  He has remained off membrane active antiarrhythmic medications with improved heart rates/chronotropic response to activity.  He has flecainide and metoprolol tartrate to use at onset of A-fib, but has not needed them.  He stopped taking Xarelto after maintaining sinus rhythm for 1 month and started low-dose daily aspirin.    Gino states that he feels well.  He is very active and exercises routinely.  He checks his pulse at least once a day and  also does an EKG with his Apple watch.  He has not had any recurrent A-fib.  He denies chest discomfort, palpitations, abdominal fullness/bloating or peripheral edema, shortness of breath, paroxysmal nocturnal dyspnea, orthopnea, lightheadedness, dizziness, pre-syncope, or syncope.    Cardiographics:  EKG done 8/11/2022 states sinus bradycardia at 55 bpm,  ms, QT/QTc 448/428 ms  EKG done 8/4/2022 states atrial fibrillation with controlled ventricular rate of 64 bpm    ECHO done 8/6/2022:   1. Bradyarrhythmia during study.     2. The left ventricular size is normal.  Systolic function is normal.     The   estimated ejection fraction is 65%.  Wall thickness is borderline   increased.   Left ventricular segmental wall motion is grossly normal, however   endocardial   definition is limited.     3. The right ventricular size is borderline mildly enlarged.  Systolic   function is normal.     4. The left atrium is mildly enlarged.     5. The right atrium is mildly enlarged.     6. There is mild mitral valve regurgitation.     7. There is mild tricuspid valve regurgitation.     8. There is trace pulmonic regurgitation.     9. Aorta is normal in dimension proximally.     10. There is no gross pericardial effusion.     NM exercise stress test done 7/20/2021:    1. Treadmill Myocard Perf Multiple SPECT w/ or w/o wall motion \T\ EF   [[id number]].     2. Resting SPECT images and Stress Gated SPECT images obtained post   Sestamibi injection.     3. The patient exercised for 11 minutes and 0 seconds on the  Hunter   protocol.     4. The patient experienced no symptoms during the stress test.     5. Excellent functional capacity for age.     6. Normal Hemodynamic response to stress.     7. Occasional PVCs during stress and rare Couplets noted.     8. Negative stress ECG for significant ST segment depression.     9. Stress and rest images show count reduction inferiorly consistent with   diaphragmatic attenuation.     10.  "Perfusion images reveal no significant territory of myocardial   infarct   or ischemia.     11. Normal left ventricular systolic function. Calculated LVEF 65 %.       I have reviewed and updated the patient's Past Medical History, Social History, Family History and Medication List.  Outside records personally reviewed.     Physical Examination  Review of Systems   Vitals: /82 (BP Location: Right arm, Patient Position: Sitting, Cuff Size: Adult Large)   Pulse 57   Resp 16   Ht 1.778 m (5' 10\")   Wt 88.9 kg (196 lb)   BMI 28.12 kg/m    BMI= Body mass index is 28.12 kg/m .  Wt Readings from Last 3 Encounters:   02/15/23 88.9 kg (196 lb)   10/27/22 85.9 kg (189 lb 6.4 oz)       General Appearance:   Alert, well-appearing and in no acute distress.   HEENT: Atraumatic, normocephalic.  No scleral icterus, normal conjunctivae, EOMs intact, PERRL.  Wearing a mask.   Chest/Lungs:   Chest symmetric, spine straight.  Respirations unlabored.  Lungs are clear to auscultation.   Cardiovascular:   Regular rate and rhythm.  Normal first and second heart sounds with no murmurs, rubs, or gallops; radial and posterior tibial pulses are intact, No edema.   Abdomen:  Soft, nondistended, bowel sounds present.   Extremities: No cyanosis or clubbing.   Musculoskeletal: Moves all extremities.    Skin: Warm, dry, intact.    Neurologic: Mood and affect are appropriate.  Alert and oriented to person, place, time, and situation.     ROS: 10 point ROS neg other than the symptoms noted above in the HPI.         Medical History  Surgical History Family History Social History   Past Medical History:   Diagnosis Date     Dyslipidemia     Created by Conversion      Moderate obstructive sleep apnea 10/30/2013    PSG: 10/29/13 - RDI 24.7, AHI 18.8, REM RDI 19.8, and low oxygen 83%. Titration: 6/2/16 - Set 8 cmH20 optimal. 9/8/20: AHI at goal at 1.3. Good compliance. Set 10 cmH20.     Paroxysmal atrial fibrillation (H)      Past Surgical " History:   Procedure Laterality Date     SPINE SURGERY N/A      Family History   Problem Relation Age of Onset     Lung Cancer Mother      Cerebrovascular Disease Father      Myocardial Infarction Father      Prostate Cancer Father      Thyroid Cancer Sister      No Known Problems Sister      Sudden Death Brother      Hypertension Brother      Congenital heart disease Brother      Coronary Artery Disease Brother      Prader-Willi syndrome Brother      No Known Problems Brother         Social History     Socioeconomic History     Marital status:      Spouse name: Not on file     Number of children: Not on file     Years of education: Not on file     Highest education level: Not on file   Occupational History     Not on file   Tobacco Use     Smoking status: Never     Smokeless tobacco: Never   Substance and Sexual Activity     Alcohol use: Never     Drug use: Never     Sexual activity: Not on file   Other Topics Concern     Not on file   Social History Narrative     Not on file     Social Determinants of Health     Financial Resource Strain: Not on file   Food Insecurity: Not on file   Transportation Needs: Not on file   Physical Activity: Not on file   Stress: Not on file   Social Connections: Not on file   Intimate Partner Violence: Not on file   Housing Stability: Not on file           Medications  Allergies   Current Outpatient Medications   Medication Sig Dispense Refill     aspirin (ASA) 81 MG chewable tablet Take 81 mg by mouth daily       atorvastatin (LIPITOR) 40 MG tablet Take 40 mg by mouth daily       cholecalciferol 50 MCG (2000 UT) tablet Take 4,000 Units by mouth       flecainide (TAMBOCOR) 50 MG tablet Take 1 tablet (50 mg) by mouth as needed (With onset of atrial fibrillation, repeat in 4 hours if still in atrial fibrillation.) 30 tablet 3     Melatonin-Pyridoxine (MELATONIN-B6 OR)        metoprolol tartrate (LOPRESSOR) 25 MG tablet Take 0.5 tablets (12.5 mg) by mouth as needed (Take with  onset of atrial fibrillation, can repeat 4 hours after if still in atrial fibrillation) 30 tablet 3     vitamin E (TOCOPHEROL) 1000 units (450 mg) CAPS capsule Take 1,000 Units by mouth daily       No Known Allergies       Lab Results    Chemistry/lipid CBC Cardiac Enzymes/BNP/TSH/INR   Outside labs reviewed           59 minutes were spent on the date of encounter performing chart review, history and exam, documentation, and further activities as noted above.          Thank you for allowing me to participate in the care of your patient.      Sincerely,     SILVINA Angel CNP     [facility]  cc:   SILVINA Angel CNP  [address], [address]  [place] [address]

## 2023-02-15 NOTE — PATIENT INSTRUCTIONS
Justin Greene,    It was a pleasure to see you today at the [facility].     My recommendations after this visit include:    Continue aspirin 81 mg daily  Restart Xarelto for AF lasting > 6-12 hrs.    Take flecainide 50 mg with metoprolol tartrate 12.5 mg at onset of A fib.  May repeat in 4 hours if needed.  Call if you are still in AF    Call if AF becomes frequent or prolonged    Follow up in 1 year, or sooner if needed    Tessy Marsh, [facility], Electrophysiology  [phone]  EP nurses [phone]

## 2023-03-08 ENCOUNTER — NURSE TRIAGE (OUTPATIENT)
Dept: CARDIOLOGY | Facility: CLINIC | Age: 73
End: 2023-03-08
Payer: COMMERCIAL

## 2023-03-08 DIAGNOSIS — I48.0 PAROXYSMAL ATRIAL FIBRILLATION (H): Primary | ICD-10-CM

## 2023-03-08 NOTE — TELEPHONE ENCOUNTER
"Received a call from the call center to discuss A fib.  Spoke to Justin who says he had one episode last week of A fib that lasted about 6 hours and then went back into regular rhythm. He says he is currently in [place], and yesterday went into A fib again. He says his pulse has been low ranging 44,46,48 bpm. He says he did take a as needed flecainide and 1/2 of a metoprolol last night, but continues to be in A fib today. He says he feels tired, otherwise feels ok. He denies feeling lightheaded or short of breath.   Advised amessage will be sent to [place] [facility] for follow up.  Please call cell phone on file.  1. DESCRIPTION: \"Please describe your heart rate or heartbeat that you are having\" (e.g., fast/slow, regular/irregular, skipped or extra beats, \"palpitations\") irregular and slow  2. ONSET: \"When did it start?\" (Minutes, hours or days) yestaerday  3. DURATION: \"How long does it last\" (e.g., seconds, minutes, hours)   4. PATTERN \"Does it come and go, or has it been constant since it started?\" \"Does it get worse with exertion?\" \"Are you feeling it now?\" constant since yesterday  5. TAP: \"Using your hand, can you tap out what you are feeling on a chair or table in front of you, so that I can hear?\" (Note: not all patients can do this)   6. HEART RATE: \"Can you tell me your heart rate?\" \"How many beats in 15 seconds?\" (Note: not all patients can do this) see above  7. RECURRENT SYMPTOM: \"Have you ever had this before?\" If Yes, ask: \"When was the last time?\" and \"What happened that time?\"   8. CAUSE: \"What do you think is causing the palpitations?\" A fib  9. CARDIAC HISTORY: \"Do you have any history of heart disease?\" (e.g., heart attack, angina, bypass surgery, angioplasty, arrhythmia) PAF  10. OTHER SYMPTOMS: \"Do you have any other symptoms?\" (e.g., dizziness, chest pain, sweating, difficulty breathing) fatigue      "

## 2023-03-09 DIAGNOSIS — I48.0 PAROXYSMAL ATRIAL FIBRILLATION (H): Primary | ICD-10-CM

## 2023-03-09 NOTE — TELEPHONE ENCOUNTER
PC to pt  He states that he is still in AF with HR 39 and 40  He is unable to take Flecainide due to HR  Reviewed with pt, no more Metoprolol  Ok to take Flecainide if HR >45  He did restart his Xarelto 20mg Daily on 3/7/23 when he went into AF  Her notes symptoms of lack of energy or wanting to do things, but is able to tolerate symptoms  Pt was previously seen by Dr Sam discussed SND, tachy-bam syndrome, and PVI  Advised pt needs to be seen due to HR and AF  Requested scheduling to contact pt to set up RAC apt  Reviewed with pt when to seek care through ER/911  Pt verbalized understanding, has no further questions or concerns at this time, and has our contact information if needed.  3/9/2023 9:07 AM  Merlyn Brunson RN

## 2023-03-09 NOTE — TELEPHONE ENCOUNTER
1st Attempt to contact pt, voicemail message was left with contact information and instructing pt to call back.  3/9/2023 8:45 AM  ABE Santos Chloe, APRN CNP Johnson, Caroline, RN 15 hours ago (5:17 PM)     CL  Ok to take another flecainide 50 mg if heart rate >45, but do not take any more metoprolol.    Also restart Xarelto in case he needs a cardioversion.   Thanks,   Renuka Arreaga RN Lebron, Chloe, APRN CNP 22 hours ago (10:03 AM)     JOSE LUIS Still,   I have not called this pt back yet, I wanted your advice on his low heart rates and his PIP flecainide.  I know your instructions state ok to take again in 4 hours, but I am very nervous with his heart rates, do you think you could give some hr parameters?   Thank you!!   Renuka

## 2023-03-13 ENCOUNTER — TELEPHONE (OUTPATIENT)
Dept: CARDIOLOGY | Facility: CLINIC | Age: 73
End: 2023-03-13
Payer: COMMERCIAL

## 2023-03-13 NOTE — TELEPHONE ENCOUNTER
PVI Referral Request    Dr Sam-    PVI referral request from Tessy Marsh NP  Pt has persistent atrial fibrillation    If agreeable will schedule as follows:  Consult: Pt prefers to see you AM of procedure   CAITLYN: Recent Echo from 8/2022 shows EF that is WNL, and no significant change in LA size- No need for CAITLYN prior if pt meets anticoagulation guidelines  PV Imaging: Per guidelines no imaging needing, as this is pts 1st PVI  Anticoagulation Status:  Chadsvasc of 1-2, will restart xarelto 4 weeks prior  AAD: Flecainide-will hold 3 days prior to PVI per guidelines   (Takes flecainide 50 mg with metoprolol tartrate 12.5 mg at onset of A-fib)  PPI: start Protonix 40mg Daily 3 days prior, and continue s/p for 6 wks  Scheduling Info:  Dr Sam preference 1:1 day  Mapping: Dr Sam preference- CIARRA, unless indicated other wise      Ok to to order/schedule with you?  Please advise  Thank you  Anne Gonzalez RN  3/13/2023 11:39 AM

## 2023-03-14 ENCOUNTER — DOCUMENTATION ONLY (OUTPATIENT)
Dept: CARDIOLOGY | Facility: CLINIC | Age: 73
End: 2023-03-14

## 2023-03-14 ENCOUNTER — TELEPHONE (OUTPATIENT)
Dept: CARDIOLOGY | Facility: CLINIC | Age: 73
End: 2023-03-14

## 2023-03-14 ENCOUNTER — OFFICE VISIT (OUTPATIENT)
Dept: CARDIOLOGY | Facility: CLINIC | Age: 73
End: 2023-03-14
Payer: COMMERCIAL

## 2023-03-14 VITALS
BODY MASS INDEX: 28.35 KG/M2 | SYSTOLIC BLOOD PRESSURE: 124 MMHG | RESPIRATION RATE: 16 BRPM | HEIGHT: 70 IN | DIASTOLIC BLOOD PRESSURE: 82 MMHG | HEART RATE: 54 BPM | WEIGHT: 198 LBS

## 2023-03-14 DIAGNOSIS — I44.2 COMPLETE HEART BLOCK (H): ICD-10-CM

## 2023-03-14 DIAGNOSIS — I48.0 PAROXYSMAL ATRIAL FIBRILLATION (H): Primary | ICD-10-CM

## 2023-03-14 DIAGNOSIS — I25.10 CORONARY ARTERY CALCIFICATION: ICD-10-CM

## 2023-03-14 PROCEDURE — 99215 OFFICE O/P EST HI 40 MIN: CPT | Performed by: NURSE PRACTITIONER

## 2023-03-14 RX ORDER — FLECAINIDE ACETATE 50 MG/1
TABLET ORAL
Qty: 30 TABLET | Refills: 3 | Status: ON HOLD | OUTPATIENT
Start: 2023-03-14 | End: 2023-03-29

## 2023-03-14 RX ORDER — PANTOPRAZOLE SODIUM 40 MG/1
40 TABLET, DELAYED RELEASE ORAL DAILY
Qty: 45 TABLET | Refills: 0 | Status: SHIPPED | OUTPATIENT
Start: 2023-03-14 | End: 2023-05-11

## 2023-03-14 NOTE — LETTER
3/14/2023    Yasmani W DEVIN Martino  [facility] [address]  [facility] [address]    RE: Justin Greene       Dear Colleague,     I had the pleasure of seeing Justin Greene in the [facility].    HEART CARE ELECTROPHYSIOLOGY NOTE      [facility]  [phone]      Assessment/Recommendations   Assessment/Plan:  1.  Persistent Atrial Fibrillation: Initial episode of A-fib in 2021 required cardioversion, though subsequent episodes have spontaneously converted back to sinus rhythm.  2 recent episodes without identifiable triggers, 1 of which lasted for 2 days despite utilization of low-dose flecainide at onset.  He previously failed antiarrhythmic therapy with low-dose sotalol due to symptomatic bradycardia and AV block.  Due to degree of sinus node dysfunction and possible history of high-grade AV block, he is not a good candidate for daily membrane active antiarrhythmic medications.  Recommend proceeding with ablation; we further discussed pulmonary vein isolation ablation utilizing radiofrequency or cryo and <1 to 2% risk for complication.  He states his questions were answered to his satisfaction and he wishes to proceed with ablation as soon as possible.    In the interim, continue to use flecainide pill in the pocket:  Take flecainide 100 mg with metoprolol tartrate 12.5 mg at onset of A-fib.  May take another flecainide 50 mg in 4 hours if needed.      He was reassured that atrial fibrillation is not life-threatening, but carries an increased risk for stroke.  He has a [id number] score of 1-2 for age 65-74 and coronary artery calcification seen on CT, though negative stress test.  We discussed risk for stroke versus risk for bleed on OAC.  Continue Xarelto 20 mg daily for stroke prophylaxis in preparation for ablation.  We discussed the need to continue OAC for a minimum of 3 months post ablation.    2.  Coronary artery calcification seen on CT: Stress test  in 2021 was negative for ischemia.  He exercises regularly with interval cycling without difficulty.  Denies anginal symptoms.  Continue statin.    3.  Obstructive sleep apnea: Consistent use of CPAP nightly.  Discussed correlation between untreated sleep apnea and atrial arrhythmias.  He was encouraged to maintain compliance with therapy.    Anticipate next follow-up will be in preparation for ablation     History of Present Illness/Subjective    HPI: Justin Greene is a 72 year old male who comes in today accompanied by his wife for EP follow-up of atrial fibrillation.  He has a history of atrial fibrillation, sinus node dysfunction, hyperlipidemia, and LISET on CPAP.  Chest CT from 2016 notes coronary artery calcifications, stress test done in 2021 was negative for ischemia.    He was diagnosed with atrial fibrillation in the summer 2021 undergoing cardioversion in August 2021, though symptom onset in July.  Symptoms consist of fatigue, decreased activity tolerance, and orthostatic lightheadedness.  He was started on sotalol in September 2021.  He was hospitalized in August 2022 for atrial fibrillation with slow ventricular response due to possible complete heart block and Lyme's disease/Anaplasma treated with doxycycline.  Initial EKG showed atrial fibrillation with a ventricular response in the 40s and 50s with a regular wide QRS complex (LBBB morphology) suggestive of complete heart block with ventricular escape rhythm.  AV conduction returned to normal with discontinuation of sotalol and he spontaneously converted back to sinus rhythm.  He has remained off membrane active antiarrhythmic medications with improved heart rates/chronotropic response to activity.  He was started on flecainide and metoprolol tartrate at onset of A-fib.  He stopped taking Xarelto after maintaining sinus rhythm for 1 month and started low-dose daily aspirin.    He had a brief episode of A-fib on 2/20/2023 and another episode of A-fib on  3/7/2023.  The more recent episode did not initially convert to sinus rhythm after taking 2 doses of flecainide 50 mg and 25 mg of metoprolol.  However, his Apple Watch was reading resting heart rates of 39 to 45 bpm.  He ultimately took a third dose of flecainide 50 mg and converted back to sinus rhythm about 8 PM on 3/9/2023 with a significant improvement in energy level.  He restarted Xarelto 8 hours after noting onset of A-fib.    Gino states that he feels well.  He is very active and exercises routinely.  He checks his pulse at least once a day and also does an EKG with his Apple watch.  He has not had any further episodes of A-fib.  He denies chest discomfort, palpitations, abdominal fullness/bloating or peripheral edema, shortness of breath, paroxysmal nocturnal dyspnea, orthopnea, lightheadedness, dizziness, pre-syncope, or syncope.  He has not had any alcohol in 7 or 8 months, minimal caffeine, and drinks lots of water daily.    Cardiographics:  EKG done 8/11/2022 states sinus bradycardia at 55 bpm,  ms, QT/QTc 448/428 ms  EKG done 8/4/2022 states atrial fibrillation with controlled ventricular rate of 64 bpm    ECHO done 8/6/2022:   1. Bradyarrhythmia during study.     2. The left ventricular size is normal.  Systolic function is normal.     The   estimated ejection fraction is 65%.  Wall thickness is borderline   increased.   Left ventricular segmental wall motion is grossly normal, however   endocardial   definition is limited.     3. The right ventricular size is borderline mildly enlarged.  Systolic   function is normal.     4. The left atrium is mildly enlarged.     5. The right atrium is mildly enlarged.     6. There is mild mitral valve regurgitation.     7. There is mild tricuspid valve regurgitation.     8. There is trace pulmonic regurgitation.     9. Aorta is normal in dimension proximally.     10. There is no gross pericardial effusion.     NM exercise stress test done 7/20/2021:    1.  "Treadmill Myocard Perf Multiple SPECT w/ or w/o wall motion \T\ EF   [[id number]].     2. Resting SPECT images and Stress Gated SPECT images obtained post   Sestamibi injection.     3. The patient exercised for 11 minutes and 0 seconds on the  Hunter   protocol.     4. The patient experienced no symptoms during the stress test.     5. Excellent functional capacity for age.     6. Normal Hemodynamic response to stress.     7. Occasional PVCs during stress and rare Couplets noted.     8. Negative stress ECG for significant ST segment depression.     9. Stress and rest images show count reduction inferiorly consistent with   diaphragmatic attenuation.     10. Perfusion images reveal no significant territory of myocardial   infarct   or ischemia.     11. Normal left ventricular systolic function. Calculated LVEF 65 %.       I have reviewed and updated the patient's Past Medical History, Social History, Family History and Medication List.  Outside records personally reviewed.     Physical Examination  Review of Systems   Vitals: /82 (BP Location: Left arm, Patient Position: Sitting, Cuff Size: Adult Regular)   Pulse 54   Resp 16   Ht 1.778 m (5' 10\")   Wt 89.8 kg (198 lb)   BMI 28.41 kg/m    BMI= Body mass index is 28.41 kg/m .  Wt Readings from Last 3 Encounters:   03/14/23 89.8 kg (198 lb)   02/15/23 88.9 kg (196 lb)   10/27/22 85.9 kg (189 lb 6.4 oz)       General Appearance:   Alert, well-appearing and in no acute distress.   HEENT: Atraumatic, normocephalic.  No scleral icterus, normal conjunctivae, EOMs intact, PERRL.  Wearing a mask.   Chest/Lungs:   Chest symmetric, spine straight.  Respirations unlabored.  Lungs are clear to auscultation.   Cardiovascular:   Regular rate and rhythm.  Normal first and second heart sounds with no murmurs, rubs, or gallops; radial and posterior tibial pulses are intact, No edema.   Abdomen:  Soft, nondistended, bowel sounds present.   Extremities: No cyanosis or clubbing. "   Musculoskeletal: Moves all extremities.    Skin: Warm, dry, intact.    Neurologic: Mood and affect are appropriate.  Alert and oriented to person, place, time, and situation.     ROS: 10 point ROS neg other than the symptoms noted above in the HPI.         Medical History  Surgical History Family History Social History   Past Medical History:   Diagnosis Date     Dyslipidemia     Created by Conversion      Moderate obstructive sleep apnea 10/30/2013    PSG: 10/29/13 - RDI 24.7, AHI 18.8, REM RDI 19.8, and low oxygen 83%. Titration: 6/2/16 - Set 8 cmH20 optimal. 9/8/20: AHI at goal at 1.3. Good compliance. Set 10 cmH20.     Paroxysmal atrial fibrillation (H)      Past Surgical History:   Procedure Laterality Date     SPINE SURGERY N/A      Family History   Problem Relation Age of Onset     Lung Cancer Mother      Cerebrovascular Disease Father      Myocardial Infarction Father      Prostate Cancer Father      Thyroid Cancer Sister      No Known Problems Sister      Sudden Death Brother      Hypertension Brother      Congenital heart disease Brother      Coronary Artery Disease Brother      Prader-Willi syndrome Brother      No Known Problems Brother         Social History     Socioeconomic History     Marital status:      Spouse name: Not on file     Number of children: Not on file     Years of education: Not on file     Highest education level: Not on file   Occupational History     Not on file   Tobacco Use     Smoking status: Never     Smokeless tobacco: Never   Substance and Sexual Activity     Alcohol use: Never     Drug use: Never     Sexual activity: Not on file   Other Topics Concern     Not on file   Social History Narrative     Not on file     Social Determinants of Health     Financial Resource Strain: Not on file   Food Insecurity: Not on file   Transportation Needs: Not on file   Physical Activity: Not on file   Stress: Not on file   Social Connections: Not on file   Intimate Partner Violence:  Not on file   Housing Stability: Not on file           Medications  Allergies   Current Outpatient Medications   Medication Sig Dispense Refill     atorvastatin (LIPITOR) 40 MG tablet Take 40 mg by mouth daily       cholecalciferol 50 MCG (2000 UT) tablet Take 4,000 Units by mouth       flecainide (TAMBOCOR) 50 MG tablet Take 100 mg at onset of A fib.  Take another 50 mg in 4 hours if still in AF. 30 tablet 3     Melatonin-Pyridoxine (MELATONIN-B6 OR)        rivaroxaban ANTICOAGULANT (XARELTO) 20 MG TABS tablet Take 1 tablet (20 mg) by mouth daily (with dinner) 90 tablet 3     vitamin E (TOCOPHEROL) 1000 units (450 mg) CAPS capsule Take 1,000 Units by mouth daily       No Known Allergies       Lab Results    Chemistry/lipid CBC Cardiac Enzymes/BNP/TSH/INR   Outside labs reviewed           47 minutes were spent on the date of encounter performing chart review, history and exam, documentation, and further activities as noted above.          Thank you for allowing me to participate in the care of your patient.      Sincerely,     SILVINA Angel CNP     [facility]  cc:   SILVINA Angel CNP  [address], [address]  [place] [address]

## 2023-03-14 NOTE — TELEPHONE ENCOUNTER
Robel Sam MD Holen, Megan, RN; P Hcc Ep Support Pool - [facility]  Caller: Unspecified (Yesterday, 11:39 AM)  Hi team,   I reviewed the patient's record.   Okay to schedule for atrial fibrillation ablation.   Preferences as outlined by Anne.   Lashaun for double up PVI day.   Thanks   Robel

## 2023-03-14 NOTE — H&P (VIEW-ONLY)
HEART CARE ELECTROPHYSIOLOGY NOTE      [facility]  [phone]      Assessment/Recommendations   Assessment/Plan:  1.  Persistent Atrial Fibrillation: Initial episode of A-fib in 2021 required cardioversion, though subsequent episodes have spontaneously converted back to sinus rhythm.  2 recent episodes without identifiable triggers, 1 of which lasted for 2 days despite utilization of low-dose flecainide at onset.  He previously failed antiarrhythmic therapy with low-dose sotalol due to symptomatic bradycardia and AV block.  Due to degree of sinus node dysfunction and possible history of high-grade AV block, he is not a good candidate for daily membrane active antiarrhythmic medications.  Recommend proceeding with ablation; we further discussed pulmonary vein isolation ablation utilizing radiofrequency or cryo and <1 to 2% risk for complication.  He states his questions were answered to his satisfaction and he wishes to proceed with ablation as soon as possible.    In the interim, continue to use flecainide pill in the pocket:  Take flecainide 100 mg with metoprolol tartrate 12.5 mg at onset of A-fib.  May take another flecainide 50 mg in 4 hours if needed.      He was reassured that atrial fibrillation is not life-threatening, but carries an increased risk for stroke.  He has a [id number] score of 1-2 for age 65-74 and coronary artery calcification seen on CT, though negative stress test.  We discussed risk for stroke versus risk for bleed on OAC.  Continue Xarelto 20 mg daily for stroke prophylaxis in preparation for ablation.  We discussed the need to continue OAC for a minimum of 3 months post ablation.    2.  Coronary artery calcification seen on CT: Stress test in 2021 was negative for ischemia.  He exercises regularly with interval cycling without difficulty.  Denies anginal symptoms.  Continue statin.    3.  Obstructive sleep apnea: Consistent use of CPAP nightly.  Discussed  correlation between untreated sleep apnea and atrial arrhythmias.  He was encouraged to maintain compliance with therapy.    Anticipate next follow-up will be in preparation for ablation     History of Present Illness/Subjective    HPI: Justin Greene is a 72 year old male who comes in today accompanied by his wife for EP follow-up of atrial fibrillation.  He has a history of atrial fibrillation, sinus node dysfunction, hyperlipidemia, and LISET on CPAP.  Chest CT from 2016 notes coronary artery calcifications, stress test done in 2021 was negative for ischemia.    He was diagnosed with atrial fibrillation in the summer 2021 undergoing cardioversion in August 2021, though symptom onset in July.  Symptoms consist of fatigue, decreased activity tolerance, and orthostatic lightheadedness.  He was started on sotalol in September 2021.  He was hospitalized in August 2022 for atrial fibrillation with slow ventricular response due to possible complete heart block and Lyme's disease/Anaplasma treated with doxycycline.  Initial EKG showed atrial fibrillation with a ventricular response in the 40s and 50s with a regular wide QRS complex (LBBB morphology) suggestive of complete heart block with ventricular escape rhythm.  AV conduction returned to normal with discontinuation of sotalol and he spontaneously converted back to sinus rhythm.  He has remained off membrane active antiarrhythmic medications with improved heart rates/chronotropic response to activity.  He was started on flecainide and metoprolol tartrate at onset of A-fib.  He stopped taking Xarelto after maintaining sinus rhythm for 1 month and started low-dose daily aspirin.    He had a brief episode of A-fib on 2/20/2023 and another episode of A-fib on 3/7/2023.  The more recent episode did not initially convert to sinus rhythm after taking 2 doses of flecainide 50 mg and 25 mg of metoprolol.  However, his Apple Watch was reading resting heart rates of 39 to 45 bpm.   He ultimately took a third dose of flecainide 50 mg and converted back to sinus rhythm about 8 PM on 3/9/2023 with a significant improvement in energy level.  He restarted Xarelto 8 hours after noting onset of A-fib.    Gino states that he feels well.  He is very active and exercises routinely.  He checks his pulse at least once a day and also does an EKG with his Apple watch.  He has not had any further episodes of A-fib.  He denies chest discomfort, palpitations, abdominal fullness/bloating or peripheral edema, shortness of breath, paroxysmal nocturnal dyspnea, orthopnea, lightheadedness, dizziness, pre-syncope, or syncope.  He has not had any alcohol in 7 or 8 months, minimal caffeine, and drinks lots of water daily.    Cardiographics:  EKG done 8/11/2022 states sinus bradycardia at 55 bpm,  ms, QT/QTc 448/428 ms  EKG done 8/4/2022 states atrial fibrillation with controlled ventricular rate of 64 bpm    ECHO done 8/6/2022:   1. Bradyarrhythmia during study.     2. The left ventricular size is normal.  Systolic function is normal.     The   estimated ejection fraction is 65%.  Wall thickness is borderline   increased.   Left ventricular segmental wall motion is grossly normal, however   endocardial   definition is limited.     3. The right ventricular size is borderline mildly enlarged.  Systolic   function is normal.     4. The left atrium is mildly enlarged.     5. The right atrium is mildly enlarged.     6. There is mild mitral valve regurgitation.     7. There is mild tricuspid valve regurgitation.     8. There is trace pulmonic regurgitation.     9. Aorta is normal in dimension proximally.     10. There is no gross pericardial effusion.     NM exercise stress test done 7/20/2021:    1. Treadmill Myocard Perf Multiple SPECT w/ or w/o wall motion \T\ EF   [[id number]].     2. Resting SPECT images and Stress Gated SPECT images obtained post   Sestamibi injection.     3. The patient exercised for 11 minutes  "and 0 seconds on the  Hunter   protocol.     4. The patient experienced no symptoms during the stress test.     5. Excellent functional capacity for age.     6. Normal Hemodynamic response to stress.     7. Occasional PVCs during stress and rare Couplets noted.     8. Negative stress ECG for significant ST segment depression.     9. Stress and rest images show count reduction inferiorly consistent with   diaphragmatic attenuation.     10. Perfusion images reveal no significant territory of myocardial   infarct   or ischemia.     11. Normal left ventricular systolic function. Calculated LVEF 65 %.       I have reviewed and updated the patient's Past Medical History, Social History, Family History and Medication List.  Outside records personally reviewed.     Physical Examination  Review of Systems   Vitals: /82 (BP Location: Left arm, Patient Position: Sitting, Cuff Size: Adult Regular)   Pulse 54   Resp 16   Ht 1.778 m (5' 10\")   Wt 89.8 kg (198 lb)   BMI 28.41 kg/m    BMI= Body mass index is 28.41 kg/m .  Wt Readings from Last 3 Encounters:   03/14/23 89.8 kg (198 lb)   02/15/23 88.9 kg (196 lb)   10/27/22 85.9 kg (189 lb 6.4 oz)       General Appearance:   Alert, well-appearing and in no acute distress.   HEENT: Atraumatic, normocephalic.  No scleral icterus, normal conjunctivae, EOMs intact, PERRL.  Wearing a mask.   Chest/Lungs:   Chest symmetric, spine straight.  Respirations unlabored.  Lungs are clear to auscultation.   Cardiovascular:   Regular rate and rhythm.  Normal first and second heart sounds with no murmurs, rubs, or gallops; radial and posterior tibial pulses are intact, No edema.   Abdomen:  Soft, nondistended, bowel sounds present.   Extremities: No cyanosis or clubbing.   Musculoskeletal: Moves all extremities.    Skin: Warm, dry, intact.    Neurologic: Mood and affect are appropriate.  Alert and oriented to person, place, time, and situation.     ROS: 10 point ROS neg other than the " symptoms noted above in the HPI.         Medical History  Surgical History Family History Social History   Past Medical History:   Diagnosis Date     Dyslipidemia     Created by Conversion      Moderate obstructive sleep apnea 10/30/2013    PSG: 10/29/13 - RDI 24.7, AHI 18.8, REM RDI 19.8, and low oxygen 83%. Titration: 6/2/16 - Set 8 cmH20 optimal. 9/8/20: AHI at goal at 1.3. Good compliance. Set 10 cmH20.     Paroxysmal atrial fibrillation (H)      Past Surgical History:   Procedure Laterality Date     SPINE SURGERY N/A      Family History   Problem Relation Age of Onset     Lung Cancer Mother      Cerebrovascular Disease Father      Myocardial Infarction Father      Prostate Cancer Father      Thyroid Cancer Sister      No Known Problems Sister      Sudden Death Brother      Hypertension Brother      Congenital heart disease Brother      Coronary Artery Disease Brother      Prader-Willi syndrome Brother      No Known Problems Brother         Social History     Socioeconomic History     Marital status:      Spouse name: Not on file     Number of children: Not on file     Years of education: Not on file     Highest education level: Not on file   Occupational History     Not on file   Tobacco Use     Smoking status: Never     Smokeless tobacco: Never   Substance and Sexual Activity     Alcohol use: Never     Drug use: Never     Sexual activity: Not on file   Other Topics Concern     Not on file   Social History Narrative     Not on file     Social Determinants of Health     Financial Resource Strain: Not on file   Food Insecurity: Not on file   Transportation Needs: Not on file   Physical Activity: Not on file   Stress: Not on file   Social Connections: Not on file   Intimate Partner Violence: Not on file   Housing Stability: Not on file           Medications  Allergies   Current Outpatient Medications   Medication Sig Dispense Refill     atorvastatin (LIPITOR) 40 MG tablet Take 40 mg by mouth daily        cholecalciferol 50 MCG (2000 UT) tablet Take 4,000 Units by mouth       flecainide (TAMBOCOR) 50 MG tablet Take 100 mg at onset of A fib.  Take another 50 mg in 4 hours if still in AF. 30 tablet 3     Melatonin-Pyridoxine (MELATONIN-B6 OR)        rivaroxaban ANTICOAGULANT (XARELTO) 20 MG TABS tablet Take 1 tablet (20 mg) by mouth daily (with dinner) 90 tablet 3     vitamin E (TOCOPHEROL) 1000 units (450 mg) CAPS capsule Take 1,000 Units by mouth daily       No Known Allergies       Lab Results    Chemistry/lipid CBC Cardiac Enzymes/BNP/TSH/INR   Outside labs reviewed           47 minutes were spent on the date of encounter performing chart review, history and exam, documentation, and further activities as noted above.

## 2023-03-14 NOTE — PROGRESS NOTES
H&P Date: Teach Date: CAITLYN No CT/  MRI No   PVI  Order Case Req Y  Order Set [] Lab/EKG  Orders [] Letter [] F/U RN Date:  NP follow-up Date:     AC Xarelto- START 4 wks prior - pt started on 3/9 will need to confirm no missed doses      AAD PRN flecainide-Hold 3 days prior   PPI Start Protonix 40mg Daily 3 days prior, 6wk post DM No   Marsh, Tessy, APRN CNP  P Hcc Ep Support Pool - e  He would like to proceed with PVI with Dr. Flaco DAVE, so I left him on Xarelto.  I also spoke with Aisha.   Thanks,   Tessy         1950  Home:[phone] (home) Cell:[phone] (mobile)  Emergency Contact: Stephen Schultz   PCP: Yasmani Martino, [phone]    Important patient information for CSC/Cath Lab staff : None    [facility] EP Cath Lab Procedure Order   Ablation Type:  PVI- Atrial Fibrillation  Diagnosis:  AF  Ordering Provider: Dr Sam  Ordering Date: 3/14/2023    Scheduling Information:  Anticipated Case Duration:  Standard   Scheduling Timeframe:  Next Available  Scheduling Restrictions: Will need to start anticoagulant 4 wks prior- schedule accordingly  Scheduling Contact: Pt is aware of scheduling limitations at this time due to schedule, please call pt when schedule is available  EP RN Follow Up Apt: Schedule EP RN PC visit 3-4 days s/p PVI  MD Preference: Scheduling with ordering provider  Current Device/Device Co Needed for Procedure: None NoneNone  Pre-Procedural Testing needed: None  Mapping System Required:  CIARRA (Dr Sam)  ICE Needed:  Yes  Anesthesia:General Anesthesia    [facility] EP Cath Lab Prep   H&P:  Schedule H&P with EP ENOCH, RN Teach, and Labs within 30 days of PVI  Pre-op Labs: CBC, BMP, Beta HcG if appropriate, and INR if on Warfarin will be ordered AM of procedure and Review of most recent labs, WEL for procedure  T&S Pre-Procedure Review: Does not need for PVI procedures  Medical Records Pertinent for Procedure:  na  Allergies: Reviewed allergies, no concerns regarding orders for  procedure    No Known Allergies    Current Outpatient Medications:      atorvastatin (LIPITOR) 40 MG tablet, Take 40 mg by mouth daily, Disp: , Rfl:      cholecalciferol 50 MCG (2000 UT) tablet, Take 4,000 Units by mouth, Disp: , Rfl:      flecainide (TAMBOCOR) 50 MG tablet, Take 1 tablet (50 mg) by mouth as needed (With onset of atrial fibrillation, repeat in 4 hours if still in atrial fibrillation.), Disp: 30 tablet, Rfl: 3     Melatonin-Pyridoxine (MELATONIN-B6 OR), , Disp: , Rfl:      rivaroxaban ANTICOAGULANT (XARELTO) 20 MG TABS tablet, Take 1 tablet (20 mg) by mouth daily (with dinner), Disp: 90 tablet, Rfl: 3     vitamin E (TOCOPHEROL) 1000 units (450 mg) CAPS capsule, Take 1,000 Units by mouth daily, Disp: , Rfl:     Documentation Date:3/14/2023 10:19 AM  Merlyn Cisneros RN

## 2023-03-14 NOTE — Clinical Note
He would like to proceed with PVI with Dr. Flaco DAVE, so I left him on Xarelto.  I also spoke with Aisha. Thanks, Tessy

## 2023-03-14 NOTE — PROGRESS NOTES
HEART CARE ELECTROPHYSIOLOGY NOTE      [facility]  [phone]      Assessment/Recommendations   Assessment/Plan:  1.  Persistent Atrial Fibrillation: Initial episode of A-fib in 2021 required cardioversion, though subsequent episodes have spontaneously converted back to sinus rhythm.  2 recent episodes without identifiable triggers, 1 of which lasted for 2 days despite utilization of low-dose flecainide at onset.  He previously failed antiarrhythmic therapy with low-dose sotalol due to symptomatic bradycardia and AV block.  Due to degree of sinus node dysfunction and possible history of high-grade AV block, he is not a good candidate for daily membrane active antiarrhythmic medications.  Recommend proceeding with ablation; we further discussed pulmonary vein isolation ablation utilizing radiofrequency or cryo and <1 to 2% risk for complication.  He states his questions were answered to his satisfaction and he wishes to proceed with ablation as soon as possible.    In the interim, continue to use flecainide pill in the pocket:  Take flecainide 100 mg with metoprolol tartrate 12.5 mg at onset of A-fib.  May take another flecainide 50 mg in 4 hours if needed.      He was reassured that atrial fibrillation is not life-threatening, but carries an increased risk for stroke.  He has a [id number] score of 1-2 for age 65-74 and coronary artery calcification seen on CT, though negative stress test.  We discussed risk for stroke versus risk for bleed on OAC.  Continue Xarelto 20 mg daily for stroke prophylaxis in preparation for ablation.  We discussed the need to continue OAC for a minimum of 3 months post ablation.    2.  Coronary artery calcification seen on CT: Stress test in 2021 was negative for ischemia.  He exercises regularly with interval cycling without difficulty.  Denies anginal symptoms.  Continue statin.    3.  Obstructive sleep apnea: Consistent use of CPAP nightly.  Discussed  correlation between untreated sleep apnea and atrial arrhythmias.  He was encouraged to maintain compliance with therapy.    Anticipate next follow-up will be in preparation for ablation     History of Present Illness/Subjective    HPI: Justin Greene is a 72 year old male who comes in today accompanied by his wife for EP follow-up of atrial fibrillation.  He has a history of atrial fibrillation, sinus node dysfunction, hyperlipidemia, and LISET on CPAP.  Chest CT from 2016 notes coronary artery calcifications, stress test done in 2021 was negative for ischemia.    He was diagnosed with atrial fibrillation in the summer 2021 undergoing cardioversion in August 2021, though symptom onset in July.  Symptoms consist of fatigue, decreased activity tolerance, and orthostatic lightheadedness.  He was started on sotalol in September 2021.  He was hospitalized in August 2022 for atrial fibrillation with slow ventricular response due to possible complete heart block and Lyme's disease/Anaplasma treated with doxycycline.  Initial EKG showed atrial fibrillation with a ventricular response in the 40s and 50s with a regular wide QRS complex (LBBB morphology) suggestive of complete heart block with ventricular escape rhythm.  AV conduction returned to normal with discontinuation of sotalol and he spontaneously converted back to sinus rhythm.  He has remained off membrane active antiarrhythmic medications with improved heart rates/chronotropic response to activity.  He was started on flecainide and metoprolol tartrate at onset of A-fib.  He stopped taking Xarelto after maintaining sinus rhythm for 1 month and started low-dose daily aspirin.    He had a brief episode of A-fib on 2/20/2023 and another episode of A-fib on 3/7/2023.  The more recent episode did not initially convert to sinus rhythm after taking 2 doses of flecainide 50 mg and 25 mg of metoprolol.  However, his Apple Watch was reading resting heart rates of 39 to 45 bpm.   He ultimately took a third dose of flecainide 50 mg and converted back to sinus rhythm about 8 PM on 3/9/2023 with a significant improvement in energy level.  He restarted Xarelto 8 hours after noting onset of A-fib.    Gino states that he feels well.  He is very active and exercises routinely.  He checks his pulse at least once a day and also does an EKG with his Apple watch.  He has not had any further episodes of A-fib.  He denies chest discomfort, palpitations, abdominal fullness/bloating or peripheral edema, shortness of breath, paroxysmal nocturnal dyspnea, orthopnea, lightheadedness, dizziness, pre-syncope, or syncope.  He has not had any alcohol in 7 or 8 months, minimal caffeine, and drinks lots of water daily.    Cardiographics:  EKG done 8/11/2022 states sinus bradycardia at 55 bpm,  ms, QT/QTc 448/428 ms  EKG done 8/4/2022 states atrial fibrillation with controlled ventricular rate of 64 bpm    ECHO done 8/6/2022:   1. Bradyarrhythmia during study.     2. The left ventricular size is normal.  Systolic function is normal.     The   estimated ejection fraction is 65%.  Wall thickness is borderline   increased.   Left ventricular segmental wall motion is grossly normal, however   endocardial   definition is limited.     3. The right ventricular size is borderline mildly enlarged.  Systolic   function is normal.     4. The left atrium is mildly enlarged.     5. The right atrium is mildly enlarged.     6. There is mild mitral valve regurgitation.     7. There is mild tricuspid valve regurgitation.     8. There is trace pulmonic regurgitation.     9. Aorta is normal in dimension proximally.     10. There is no gross pericardial effusion.     NM exercise stress test done 7/20/2021:    1. Treadmill Myocard Perf Multiple SPECT w/ or w/o wall motion \T\ EF   [[id number]].     2. Resting SPECT images and Stress Gated SPECT images obtained post   Sestamibi injection.     3. The patient exercised for 11 minutes  "and 0 seconds on the  Hunter   protocol.     4. The patient experienced no symptoms during the stress test.     5. Excellent functional capacity for age.     6. Normal Hemodynamic response to stress.     7. Occasional PVCs during stress and rare Couplets noted.     8. Negative stress ECG for significant ST segment depression.     9. Stress and rest images show count reduction inferiorly consistent with   diaphragmatic attenuation.     10. Perfusion images reveal no significant territory of myocardial   infarct   or ischemia.     11. Normal left ventricular systolic function. Calculated LVEF 65 %.       I have reviewed and updated the patient's Past Medical History, Social History, Family History and Medication List.  Outside records personally reviewed.     Physical Examination  Review of Systems   Vitals: /82 (BP Location: Left arm, Patient Position: Sitting, Cuff Size: Adult Regular)   Pulse 54   Resp 16   Ht 1.778 m (5' 10\")   Wt 89.8 kg (198 lb)   BMI 28.41 kg/m    BMI= Body mass index is 28.41 kg/m .  Wt Readings from Last 3 Encounters:   03/14/23 89.8 kg (198 lb)   02/15/23 88.9 kg (196 lb)   10/27/22 85.9 kg (189 lb 6.4 oz)       General Appearance:   Alert, well-appearing and in no acute distress.   HEENT: Atraumatic, normocephalic.  No scleral icterus, normal conjunctivae, EOMs intact, PERRL.  Wearing a mask.   Chest/Lungs:   Chest symmetric, spine straight.  Respirations unlabored.  Lungs are clear to auscultation.   Cardiovascular:   Regular rate and rhythm.  Normal first and second heart sounds with no murmurs, rubs, or gallops; radial and posterior tibial pulses are intact, No edema.   Abdomen:  Soft, nondistended, bowel sounds present.   Extremities: No cyanosis or clubbing.   Musculoskeletal: Moves all extremities.    Skin: Warm, dry, intact.    Neurologic: Mood and affect are appropriate.  Alert and oriented to person, place, time, and situation.     ROS: 10 point ROS neg other than the " symptoms noted above in the HPI.         Medical History  Surgical History Family History Social History   Past Medical History:   Diagnosis Date     Dyslipidemia     Created by Conversion      Moderate obstructive sleep apnea 10/30/2013    PSG: 10/29/13 - RDI 24.7, AHI 18.8, REM RDI 19.8, and low oxygen 83%. Titration: 6/2/16 - Set 8 cmH20 optimal. 9/8/20: AHI at goal at 1.3. Good compliance. Set 10 cmH20.     Paroxysmal atrial fibrillation (H)      Past Surgical History:   Procedure Laterality Date     SPINE SURGERY N/A      Family History   Problem Relation Age of Onset     Lung Cancer Mother      Cerebrovascular Disease Father      Myocardial Infarction Father      Prostate Cancer Father      Thyroid Cancer Sister      No Known Problems Sister      Sudden Death Brother      Hypertension Brother      Congenital heart disease Brother      Coronary Artery Disease Brother      Prader-Willi syndrome Brother      No Known Problems Brother         Social History     Socioeconomic History     Marital status:      Spouse name: Not on file     Number of children: Not on file     Years of education: Not on file     Highest education level: Not on file   Occupational History     Not on file   Tobacco Use     Smoking status: Never     Smokeless tobacco: Never   Substance and Sexual Activity     Alcohol use: Never     Drug use: Never     Sexual activity: Not on file   Other Topics Concern     Not on file   Social History Narrative     Not on file     Social Determinants of Health     Financial Resource Strain: Not on file   Food Insecurity: Not on file   Transportation Needs: Not on file   Physical Activity: Not on file   Stress: Not on file   Social Connections: Not on file   Intimate Partner Violence: Not on file   Housing Stability: Not on file           Medications  Allergies   Current Outpatient Medications   Medication Sig Dispense Refill     atorvastatin (LIPITOR) 40 MG tablet Take 40 mg by mouth daily        cholecalciferol 50 MCG (2000 UT) tablet Take 4,000 Units by mouth       flecainide (TAMBOCOR) 50 MG tablet Take 100 mg at onset of A fib.  Take another 50 mg in 4 hours if still in AF. 30 tablet 3     Melatonin-Pyridoxine (MELATONIN-B6 OR)        rivaroxaban ANTICOAGULANT (XARELTO) 20 MG TABS tablet Take 1 tablet (20 mg) by mouth daily (with dinner) 90 tablet 3     vitamin E (TOCOPHEROL) 1000 units (450 mg) CAPS capsule Take 1,000 Units by mouth daily       No Known Allergies       Lab Results    Chemistry/lipid CBC Cardiac Enzymes/BNP/TSH/INR   Outside labs reviewed           47 minutes were spent on the date of encounter performing chart review, history and exam, documentation, and further activities as noted above.

## 2023-03-14 NOTE — PATIENT INSTRUCTIONS
Justin Greene,    It was a pleasure to see you today at the [facility].     My recommendations after this visit include:    Plan for ablation with Dr. Sam  Continue Xarelto until we know more about ablation scheduling    Take flecainide 100 mg (2 tabs) with metoprolol 12.5 mg (1/2 tab) at onset of A fib.  May take another flecainide 50 mg in 4 hours if still in A fib. (Do not repeat metoprolol)    Tessy Marsh, CNP  [facility], Electrophysiology  [phone]  EP nurses [phone]

## 2023-03-23 ENCOUNTER — PREP FOR PROCEDURE (OUTPATIENT)
Dept: CARDIOLOGY | Facility: CLINIC | Age: 73
End: 2023-03-23

## 2023-03-23 ENCOUNTER — LAB (OUTPATIENT)
Dept: CARDIOLOGY | Facility: CLINIC | Age: 73
End: 2023-03-23
Payer: COMMERCIAL

## 2023-03-23 ENCOUNTER — ALLIED HEALTH/NURSE VISIT (OUTPATIENT)
Dept: CARDIOLOGY | Facility: CLINIC | Age: 73
End: 2023-03-23
Payer: COMMERCIAL

## 2023-03-23 VITALS
HEART RATE: 55 BPM | WEIGHT: 195 LBS | BODY MASS INDEX: 27.92 KG/M2 | RESPIRATION RATE: 16 BRPM | SYSTOLIC BLOOD PRESSURE: 112 MMHG | HEIGHT: 70 IN | DIASTOLIC BLOOD PRESSURE: 78 MMHG

## 2023-03-23 DIAGNOSIS — I48.0 PAROXYSMAL ATRIAL FIBRILLATION (H): ICD-10-CM

## 2023-03-23 DIAGNOSIS — I48.0 PAROXYSMAL ATRIAL FIBRILLATION (H): Primary | ICD-10-CM

## 2023-03-23 LAB
ANION GAP SERPL CALCULATED.3IONS-SCNC: 8 MMOL/L (ref 7–15)
ATRIAL RATE - MUSE: 55 BPM
BUN SERPL-MCNC: 24.8 MG/DL (ref 8–23)
CALCIUM SERPL-MCNC: 9.4 MG/DL (ref 8.8–10.2)
CHLORIDE SERPL-SCNC: 107 MMOL/L (ref 98–107)
CREAT SERPL-MCNC: 1.27 MG/DL (ref 0.67–1.17)
DEPRECATED HCO3 PLAS-SCNC: 25 MMOL/L (ref 22–29)
DIASTOLIC BLOOD PRESSURE - MUSE: NORMAL MMHG
ERYTHROCYTE [DISTWIDTH] IN BLOOD BY AUTOMATED COUNT: 12.5 % (ref 10–15)
GFR SERPL CREATININE-BSD FRML MDRD: 60 ML/MIN/1.73M2
GLUCOSE SERPL-MCNC: 104 MG/DL (ref 70–99)
HCT VFR BLD AUTO: 48.2 % (ref 40–53)
HGB BLD-MCNC: 16.3 G/DL (ref 13.3–17.7)
INTERPRETATION ECG - MUSE: NORMAL
MCH RBC QN AUTO: 31.5 PG (ref 26.5–33)
MCHC RBC AUTO-ENTMCNC: 33.8 G/DL (ref 31.5–36.5)
MCV RBC AUTO: 93 FL (ref 78–100)
P AXIS - MUSE: 14 DEGREES
PLATELET # BLD AUTO: 192 10E3/UL (ref 150–450)
POTASSIUM SERPL-SCNC: 4.4 MMOL/L (ref 3.4–5.3)
PR INTERVAL - MUSE: 210 MS
QRS DURATION - MUSE: 100 MS
QT - MUSE: 408 MS
QTC - MUSE: 390 MS
R AXIS - MUSE: 57 DEGREES
RBC # BLD AUTO: 5.18 10E6/UL (ref 4.4–5.9)
SODIUM SERPL-SCNC: 140 MMOL/L (ref 136–145)
SYSTOLIC BLOOD PRESSURE - MUSE: NORMAL MMHG
T AXIS - MUSE: 13 DEGREES
VENTRICULAR RATE- MUSE: 55 BPM
WBC # BLD AUTO: 4.2 10E3/UL (ref 4–11)

## 2023-03-23 PROCEDURE — 85027 COMPLETE CBC AUTOMATED: CPT

## 2023-03-23 PROCEDURE — 36415 COLL VENOUS BLD VENIPUNCTURE: CPT

## 2023-03-23 PROCEDURE — 93000 ELECTROCARDIOGRAM COMPLETE: CPT | Performed by: INTERNAL MEDICINE

## 2023-03-23 PROCEDURE — 80048 BASIC METABOLIC PNL TOTAL CA: CPT

## 2023-03-23 PROCEDURE — 99207 PR NO CHARGE NURSE ONLY: CPT

## 2023-03-23 RX ORDER — FENTANYL CITRATE 50 UG/ML
25 INJECTION, SOLUTION INTRAMUSCULAR; INTRAVENOUS
Status: CANCELLED | OUTPATIENT
Start: 2023-03-23

## 2023-03-23 RX ORDER — LIDOCAINE 40 MG/G
CREAM TOPICAL
Status: CANCELLED | OUTPATIENT
Start: 2023-03-23

## 2023-03-23 RX ORDER — SODIUM CHLORIDE 9 MG/ML
100 INJECTION, SOLUTION INTRAVENOUS CONTINUOUS
Status: CANCELLED | OUTPATIENT
Start: 2023-03-23

## 2023-03-23 NOTE — PROGRESS NOTES
Pre-Procedure Pulmonary Vein Ablation (AF) Education    Procedure: PVI with Dr Sam on 3-29-23 with arrival time 5:30am    COVID: Pt denies COVID like symptoms, and is aware if he/she develops COVID like symptoms they would need to complete an at home with a rapid antigen COVID test 1-2 days prior to your procedure date. If COVID + pt is aware the procedure will need to be rescheduled, and to contact CV scheduling as soon as possible    Type & Screen: Is not required for PVI Ablation    Pre-Op H&P: Completed on 3-14-23 in Epic with Tessy Marsh NP    Education:   Reviewed with pt in Clinic today  Pre-Procedure Instruction: NPO after midnight pre procedure, Defined NPO, Remove all jewelry and leave all valuables at home, Shower prior to arrival, Anesthesia and intubation plan/orders, Intra-procedure PVI process, Post- PVI procedure expectations/recovery, Transportation requirements and arrangements post procedure, Post-procedure follow up process, Letter sent to pt via [employer] and mail with written instructions (Refer to letters tab), Lab results would be called to pt if abnormal  Risks Reviewed:   Pulmonary Vein/AF/Radiofrequency Ablation  In addition to standard risks for Radiofrequency Ablation, there is:    <2% for significant pulmonary vein stenosis    <2% risk for embolic events    <1% risk for esophageal fistula    <1% risk death      Pulmonary Vein Isolation / Cryoablation Risks:    1-2% risk for phrenic nerve paralysis    <1% risk for pulmonary vein stenosis    Risk of esophageal irritation with no incidence of atrial-esophageal fistula    Rare cryoballoon rupture    <1% risk death       Cardiac Catheter Ablation    <1% risk for the following: hypotension, hemorrhage, vascular injury including perforation of vein, artery or heart, thrombophlebitis, systemic or pulmonic emboli; cardiac perforation, (tamponade), infection, pneumothorax, arrhythmias, proarrhythmic effects of drugs, radiation exposure.    1-2%  complete heart block (for AVNRT or septol accessory pathway).    <0.5% CVA or MI    <0.1% death    If external defibrillation is needed, 75% risk for superficial burn.    1-2% tamponade and aortic puncture with left sided transeptal approach for left side CIARRA - increase risk of CVA to <2%.    Late arrhythmia recurrences depends upon the primary rhythm disturbance.    Medication:   Instructions regarding anticoagulants: Xarelto- Continue anticoagulation uninterrupted through their procedure, do not miss any doses of AC prior to procedure, importance of taking AC for stroke prevention, taking AC as prescribed, to call prior to PVI if missed a dose of AC, and if upon arrival pt reports missing a dose of AC PVI will potentially be cnx/postponed; pt denies any missed doses.  He does take in the am and instructed pt to take in the am of procedure.  Instructions given to pt regarding antiarrhythmic medication: PRN Flecainide - Hold 3 days prior to procedure  Instructions given to pt regarding PPI medication: Start Protonix 40mg Daily 3 days prior, 6wk post  Instructions for medication, other than anticoagulants and antiarrhythmics listed above, given to pt: hold all medication AM of procedure     Important patient information for staff: None    3/23/2023 4:00 PM  Renuka Augustin RN

## 2023-03-28 ENCOUNTER — ANESTHESIA EVENT (OUTPATIENT)
Dept: CARDIOLOGY | Facility: HOSPITAL | Age: 73
End: 2023-03-28
Payer: COMMERCIAL

## 2023-03-29 ENCOUNTER — ANESTHESIA (OUTPATIENT)
Dept: CARDIOLOGY | Facility: HOSPITAL | Age: 73
End: 2023-03-29
Payer: COMMERCIAL

## 2023-03-29 ENCOUNTER — HOSPITAL ENCOUNTER (OUTPATIENT)
Facility: HOSPITAL | Age: 73
Discharge: HOME | End: 2023-03-29
Attending: INTERNAL MEDICINE | Admitting: INTERNAL MEDICINE
Payer: COMMERCIAL

## 2023-03-29 VITALS
HEIGHT: 70 IN | OXYGEN SATURATION: 94 % | BODY MASS INDEX: 27.29 KG/M2 | RESPIRATION RATE: 18 BRPM | DIASTOLIC BLOOD PRESSURE: 67 MMHG | TEMPERATURE: 97.9 F | WEIGHT: 190.6 LBS | SYSTOLIC BLOOD PRESSURE: 113 MMHG | HEART RATE: 72 BPM

## 2023-03-29 DIAGNOSIS — I48.0 PAROXYSMAL ATRIAL FIBRILLATION (H): Primary | ICD-10-CM

## 2023-03-29 LAB
ACT BLD: 407 SECONDS (ref 74–150)
ACT BLD: 437 SECONDS (ref 74–150)
ACT BLD: 454 SECONDS (ref 74–150)
ACT BLD: 531 SECONDS (ref 74–150)
ATRIAL RATE - MUSE: 71 BPM
DIASTOLIC BLOOD PRESSURE - MUSE: NORMAL MMHG
INTERPRETATION ECG - MUSE: NORMAL
P AXIS - MUSE: 35 DEGREES
PR INTERVAL - MUSE: 204 MS
QRS DURATION - MUSE: 90 MS
QT - MUSE: 360 MS
QTC - MUSE: 391 MS
R AXIS - MUSE: 63 DEGREES
SYSTOLIC BLOOD PRESSURE - MUSE: NORMAL MMHG
T AXIS - MUSE: 46 DEGREES
VENTRICULAR RATE- MUSE: 71 BPM

## 2023-03-29 PROCEDURE — 250N000009 HC RX 250: Performed by: INTERNAL MEDICINE

## 2023-03-29 PROCEDURE — 370N000017 HC ANESTHESIA TECHNICAL FEE, PER MIN: Performed by: INTERNAL MEDICINE

## 2023-03-29 PROCEDURE — 93010 ELECTROCARDIOGRAM REPORT: CPT | Performed by: INTERNAL MEDICINE

## 2023-03-29 PROCEDURE — 250N000009 HC RX 250: Performed by: NURSE ANESTHETIST, CERTIFIED REGISTERED

## 2023-03-29 PROCEDURE — C1769 GUIDE WIRE: HCPCS | Performed by: INTERNAL MEDICINE

## 2023-03-29 PROCEDURE — C1730 CATH, EP, 19 OR FEW ELECT: HCPCS | Performed by: INTERNAL MEDICINE

## 2023-03-29 PROCEDURE — 258N000003 HC RX IP 258 OP 636: Performed by: INTERNAL MEDICINE

## 2023-03-29 PROCEDURE — 999N000054 HC STATISTIC EKG NON-CHARGEABLE

## 2023-03-29 PROCEDURE — 250N000011 HC RX IP 250 OP 636: Performed by: INTERNAL MEDICINE

## 2023-03-29 PROCEDURE — 250N000011 HC RX IP 250 OP 636: Performed by: NURSE ANESTHETIST, CERTIFIED REGISTERED

## 2023-03-29 PROCEDURE — 258N000003 HC RX IP 258 OP 636: Performed by: NURSE ANESTHETIST, CERTIFIED REGISTERED

## 2023-03-29 PROCEDURE — C1894 INTRO/SHEATH, NON-LASER: HCPCS | Performed by: INTERNAL MEDICINE

## 2023-03-29 PROCEDURE — C1732 CATH, EP, DIAG/ABL, 3D/VECT: HCPCS | Performed by: INTERNAL MEDICINE

## 2023-03-29 PROCEDURE — 93656 COMPRE EP EVAL ABLTJ ATR FIB: CPT | Performed by: INTERNAL MEDICINE

## 2023-03-29 PROCEDURE — 710N000010 HC RECOVERY PHASE 1, LEVEL 2, PER MIN

## 2023-03-29 PROCEDURE — 272N000001 HC OR GENERAL SUPPLY STERILE: Performed by: INTERNAL MEDICINE

## 2023-03-29 PROCEDURE — C1759 CATH, INTRA ECHOCARDIOGRAPHY: HCPCS | Performed by: INTERNAL MEDICINE

## 2023-03-29 PROCEDURE — C1733 CATH, EP, OTHR THAN COOL-TIP: HCPCS | Performed by: INTERNAL MEDICINE

## 2023-03-29 PROCEDURE — 93005 ELECTROCARDIOGRAM TRACING: CPT

## 2023-03-29 PROCEDURE — C1887 CATHETER, GUIDING: HCPCS | Performed by: INTERNAL MEDICINE

## 2023-03-29 PROCEDURE — 250N000011 HC RX IP 250 OP 636

## 2023-03-29 PROCEDURE — 85347 COAGULATION TIME ACTIVATED: CPT

## 2023-03-29 RX ORDER — ASPIRIN 81 MG/1
81 TABLET, CHEWABLE ORAL DAILY
Qty: 30 TABLET | Refills: 0
Start: 2023-03-29 | End: 2023-05-11

## 2023-03-29 RX ORDER — IBUPROFEN 600 MG/1
600 TABLET, FILM COATED ORAL EVERY 6 HOURS PRN
Status: DISCONTINUED | OUTPATIENT
Start: 2023-03-29 | End: 2023-03-29 | Stop reason: HOSPADM

## 2023-03-29 RX ORDER — NALOXONE HYDROCHLORIDE 0.4 MG/ML
0.4 INJECTION, SOLUTION INTRAMUSCULAR; INTRAVENOUS; SUBCUTANEOUS
Status: DISCONTINUED | OUTPATIENT
Start: 2023-03-29 | End: 2023-03-29 | Stop reason: HOSPADM

## 2023-03-29 RX ORDER — ONDANSETRON 2 MG/ML
INJECTION INTRAMUSCULAR; INTRAVENOUS PRN
Status: DISCONTINUED | OUTPATIENT
Start: 2023-03-29 | End: 2023-03-29

## 2023-03-29 RX ORDER — OXYCODONE HYDROCHLORIDE 5 MG/1
10 TABLET ORAL EVERY 4 HOURS PRN
Status: DISCONTINUED | OUTPATIENT
Start: 2023-03-29 | End: 2023-03-29 | Stop reason: HOSPADM

## 2023-03-29 RX ORDER — PROTAMINE SULFATE 10 MG/ML
INJECTION, SOLUTION INTRAVENOUS PRN
Status: DISCONTINUED | OUTPATIENT
Start: 2023-03-29 | End: 2023-03-29

## 2023-03-29 RX ORDER — SODIUM CHLORIDE, SODIUM LACTATE, POTASSIUM CHLORIDE, CALCIUM CHLORIDE 600; 310; 30; 20 MG/100ML; MG/100ML; MG/100ML; MG/100ML
INJECTION, SOLUTION INTRAVENOUS CONTINUOUS
Status: DISCONTINUED | OUTPATIENT
Start: 2023-03-29 | End: 2023-03-29 | Stop reason: HOSPADM

## 2023-03-29 RX ORDER — ASPIRIN 81 MG/1
81 TABLET, CHEWABLE ORAL ONCE
Status: DISCONTINUED | OUTPATIENT
Start: 2023-03-29 | End: 2023-05-11

## 2023-03-29 RX ORDER — KETAMINE HYDROCHLORIDE 10 MG/ML
INJECTION INTRAMUSCULAR; INTRAVENOUS PRN
Status: DISCONTINUED | OUTPATIENT
Start: 2023-03-29 | End: 2023-03-29

## 2023-03-29 RX ORDER — HEPARIN SODIUM 10000 [USP'U]/100ML
INJECTION, SOLUTION INTRAVENOUS CONTINUOUS PRN
Status: DISCONTINUED | OUTPATIENT
Start: 2023-03-29 | End: 2023-03-29 | Stop reason: HOSPADM

## 2023-03-29 RX ORDER — NALOXONE HYDROCHLORIDE 0.4 MG/ML
0.2 INJECTION, SOLUTION INTRAMUSCULAR; INTRAVENOUS; SUBCUTANEOUS
Status: DISCONTINUED | OUTPATIENT
Start: 2023-03-29 | End: 2023-03-29 | Stop reason: HOSPADM

## 2023-03-29 RX ORDER — ASPIRIN 81 MG/1
81 TABLET, CHEWABLE ORAL DAILY
Status: DISCONTINUED | OUTPATIENT
Start: 2023-03-29 | End: 2023-03-29

## 2023-03-29 RX ORDER — HEPARIN SODIUM 1000 [USP'U]/ML
INJECTION, SOLUTION INTRAVENOUS; SUBCUTANEOUS
Status: DISCONTINUED | OUTPATIENT
Start: 2023-03-29 | End: 2023-03-29 | Stop reason: HOSPADM

## 2023-03-29 RX ORDER — SODIUM CHLORIDE 9 MG/ML
100 INJECTION, SOLUTION INTRAVENOUS CONTINUOUS
Status: DISCONTINUED | OUTPATIENT
Start: 2023-03-29 | End: 2023-03-29 | Stop reason: HOSPADM

## 2023-03-29 RX ORDER — EPHEDRINE SULFATE 50 MG/ML
INJECTION, SOLUTION INTRAMUSCULAR; INTRAVENOUS; SUBCUTANEOUS PRN
Status: DISCONTINUED | OUTPATIENT
Start: 2023-03-29 | End: 2023-03-29

## 2023-03-29 RX ORDER — LIDOCAINE HYDROCHLORIDE 10 MG/ML
INJECTION, SOLUTION INFILTRATION; PERINEURAL PRN
Status: DISCONTINUED | OUTPATIENT
Start: 2023-03-29 | End: 2023-03-29

## 2023-03-29 RX ORDER — LIDOCAINE 40 MG/G
CREAM TOPICAL
Status: DISCONTINUED | OUTPATIENT
Start: 2023-03-29 | End: 2023-03-29 | Stop reason: HOSPADM

## 2023-03-29 RX ORDER — SODIUM CHLORIDE 9 MG/ML
INJECTION, SOLUTION INTRAVENOUS CONTINUOUS PRN
Status: DISCONTINUED | OUTPATIENT
Start: 2023-03-29 | End: 2023-03-29

## 2023-03-29 RX ORDER — OXYCODONE HYDROCHLORIDE 5 MG/1
5 TABLET ORAL EVERY 4 HOURS PRN
Status: DISCONTINUED | OUTPATIENT
Start: 2023-03-29 | End: 2023-03-29 | Stop reason: HOSPADM

## 2023-03-29 RX ORDER — ACETAMINOPHEN 325 MG/1
650 TABLET ORAL EVERY 4 HOURS PRN
Status: DISCONTINUED | OUTPATIENT
Start: 2023-03-29 | End: 2023-03-29 | Stop reason: HOSPADM

## 2023-03-29 RX ORDER — ONDANSETRON 4 MG/1
4 TABLET, ORALLY DISINTEGRATING ORAL EVERY 6 HOURS PRN
Status: DISCONTINUED | OUTPATIENT
Start: 2023-03-29 | End: 2023-03-29 | Stop reason: HOSPADM

## 2023-03-29 RX ORDER — PROPOFOL 10 MG/ML
INJECTION, EMULSION INTRAVENOUS PRN
Status: DISCONTINUED | OUTPATIENT
Start: 2023-03-29 | End: 2023-03-29

## 2023-03-29 RX ORDER — FENTANYL CITRATE 50 UG/ML
INJECTION, SOLUTION INTRAMUSCULAR; INTRAVENOUS PRN
Status: DISCONTINUED | OUTPATIENT
Start: 2023-03-29 | End: 2023-03-29

## 2023-03-29 RX ORDER — ONDANSETRON 2 MG/ML
4 INJECTION INTRAMUSCULAR; INTRAVENOUS EVERY 6 HOURS PRN
Status: DISCONTINUED | OUTPATIENT
Start: 2023-03-29 | End: 2023-03-29 | Stop reason: HOSPADM

## 2023-03-29 RX ORDER — ASPIRIN 81 MG/1
81 TABLET, CHEWABLE ORAL DAILY
Status: DISCONTINUED | OUTPATIENT
Start: 2023-03-29 | End: 2023-05-11

## 2023-03-29 RX ORDER — DEXAMETHASONE SODIUM PHOSPHATE 10 MG/ML
INJECTION, SOLUTION INTRAMUSCULAR; INTRAVENOUS PRN
Status: DISCONTINUED | OUTPATIENT
Start: 2023-03-29 | End: 2023-03-29

## 2023-03-29 RX ORDER — FENTANYL CITRATE 50 UG/ML
25 INJECTION, SOLUTION INTRAMUSCULAR; INTRAVENOUS
Status: DISCONTINUED | OUTPATIENT
Start: 2023-03-29 | End: 2023-03-29 | Stop reason: HOSPADM

## 2023-03-29 RX ADMIN — SODIUM CHLORIDE 100 ML/HR: 9 INJECTION, SOLUTION INTRAVENOUS at 11:03

## 2023-03-29 RX ADMIN — ONDANSETRON 4 MG: 2 INJECTION INTRAMUSCULAR; INTRAVENOUS at 09:36

## 2023-03-29 RX ADMIN — SODIUM CHLORIDE: 9 INJECTION, SOLUTION INTRAVENOUS at 07:20

## 2023-03-29 RX ADMIN — Medication 5 MG: at 07:54

## 2023-03-29 RX ADMIN — SODIUM CHLORIDE: 9 INJECTION, SOLUTION INTRAVENOUS at 08:55

## 2023-03-29 RX ADMIN — ROCURONIUM BROMIDE 50 MG: 50 INJECTION, SOLUTION INTRAVENOUS at 07:10

## 2023-03-29 RX ADMIN — KETAMINE HYDROCHLORIDE 50 MG: 10 INJECTION, SOLUTION INTRAMUSCULAR; INTRAVENOUS at 07:10

## 2023-03-29 RX ADMIN — DEXAMETHASONE SODIUM PHOSPHATE 10 MG: 10 INJECTION, SOLUTION INTRAMUSCULAR; INTRAVENOUS at 07:21

## 2023-03-29 RX ADMIN — PROPOFOL 200 MG: 10 INJECTION, EMULSION INTRAVENOUS at 07:10

## 2023-03-29 RX ADMIN — SODIUM CHLORIDE 100 ML/HR: 9 INJECTION, SOLUTION INTRAVENOUS at 06:01

## 2023-03-29 RX ADMIN — Medication 5 MG: at 08:49

## 2023-03-29 RX ADMIN — FENTANYL CITRATE 100 MCG: 50 INJECTION, SOLUTION INTRAMUSCULAR; INTRAVENOUS at 07:37

## 2023-03-29 RX ADMIN — LIDOCAINE HYDROCHLORIDE 5 ML: 10 INJECTION, SOLUTION INFILTRATION; PERINEURAL at 07:10

## 2023-03-29 RX ADMIN — PHENYLEPHRINE HYDROCHLORIDE 0.3 MCG/KG/MIN: 10 INJECTION INTRAVENOUS at 07:28

## 2023-03-29 RX ADMIN — SUGAMMADEX 200 MG: 100 INJECTION, SOLUTION INTRAVENOUS at 09:54

## 2023-03-29 RX ADMIN — PROTAMINE SULFATE 100 MG: 10 INJECTION, SOLUTION INTRAVENOUS at 09:36

## 2023-03-29 ASSESSMENT — ACTIVITIES OF DAILY LIVING (ADL)
ADLS_ACUITY_SCORE: 35

## 2023-03-29 ASSESSMENT — ENCOUNTER SYMPTOMS: DYSRHYTHMIAS: 1

## 2023-03-29 NOTE — Clinical Note
Potential access sites were evaluated for patency using ultrasound.   The right and left femoral vein was selected. Access was obtained under with Sonosite guidance using a micropuncture 21 gauge needle with direct visualization of needle entry.

## 2023-03-29 NOTE — INTERVAL H&P NOTE
"I have reviewed the surgical (or preoperative) H&P that is linked to this encounter, and examined the patient. There are no significant changes    Clinical Conditions Present on Arrival:  Clinically Significant Risk Factors Present on Admission                  # Drug Induced Coagulation Defect: home medication list includes an anticoagulant medication   # Overweight: Estimated body mass index is 27.35 kg/m  as calculated from the following:    Height as of this encounter: 1.778 m (5' 10\").    Weight as of this encounter: 86.5 kg (190 lb 9.6 oz).       "

## 2023-03-29 NOTE — Clinical Note
[facility] Med system 12 lead EKG, hemodynamics 5 lead, pulse oximetery, NIBP, Physiocontrol hands off defibrillator/external pacer, with 3 monitoring leads to patient. Baseline assessment done.

## 2023-03-29 NOTE — PLAN OF CARE
Received from previous RN @ 1330. Left femoral groin is noted have some bruising and swelling but soft to touch, no hematoma. Previous RN stated that patient developed hematoma right away post procedure and that the doctor help manual pressure on the left groin. Patient was kept comfortable during post-procedure stay.VSS. Denies pain. Right and left femoral access sites remain dry & free from signs of bleeding post stop cock removal and post ambulation. Appointments made & included in AVS. Dr. Sam was able to speak with patient and wife prior to discharge. He is also able to check the left groin which looks the same. Post-op instructions given to patient & spouse. Able to ask questions. Verbalized no concerns. Belongings returned. Discharged in stable condition.

## 2023-03-29 NOTE — DISCHARGE INSTRUCTIONS
Information on Atrial Fibrillation Ablations    What is Catheter Ablation?  A Catheter Ablation is a procedure that treats certain types of abnormal heart rhythms (arrhythmia). There are several components to the procedure, but the final purpose is target and destroy (ablate) small areas of your heart muscle that are causing the arrhythmia.     Why is an Ablations Done?  A catheter ablation is an effective way to treat some types of abnormal heart rhythms. An ablation is a relatively low risk procedure that may permanently cure your abnormal heart rhythm.  The ablation process damages the heart cells which results in scarring of that area. The scar is electrically inactive and can produce a permanent cure for the abnormal rhythm.  Ablation procedures can help avoid the need for rhythm medications and give patients the ability to return to their normal activity and live an active life. In patients that do not have symptoms, ablations are not typically done as there may still be an increased risk of stroke.    Why is Catheter Ablation Done?  Sometimes, the heart s electrical system does not work properly.  This can cause abnormal heart rhythms, called arrhythmias.  During an arrhythmia, the heart may beat too fast, too slowly, or irregularly.  Your doctor has recommended catheter ablation to treat a rapid (fast) heart rhythm, or tachycardia.      How Catheter Ablation Is Done  Catheter ablation uses thin, flexible wires called electrode catheters to find and destroy (ablate) problem cells.     Here is how the procedure is done:  The pulmonary veins will be treated first. There are currently two tools used to ablate around the pulmonary veins.  Radiofrequency catheter will heat the tissue.  Cryo-balloon catheter will freeze the tissue.   Testing will be done to confirm that effective treatment has been delivered.   Further testing may be performed to see if a fib is still present or if some other rhythm problem such  as atrial flutter is present. If an ongoing rhythm problem is discovered then further ablation can be done to isolate and destroy those areas responsible for the arrhythmia.     Your  Experience during Catheter Ablation    In most cases, catheter ablations are done in an electrophysiology (EP) lab.  The procedural area: You will be transferred back to the procedure room once you have been appropriately prepped by the nursing staff and you are ready for your ablation.   Sedation: You to be put completely asleep for your ablation using general anesthesia.  Inserting the catheters: You will have 3-4 catheters inserted into the veins. Catheter locations can include the shoulder, neck, and groins. Catheters are guided to the heart with the help of ultrasound and x-ray monitors.  Finishing up: When the procedure is finished, the catheters are taken out of your body. A special closure device or suture may be used to help seal these puncture sites. You re then taken to your room to rest, and will be cared for by a nurse during your recovery.    Risks and Complications  The risks of catheter ablation are fairly low compared to the benefits you receive. Possible risks and complications include:  Common (up to 10%)  Bleeding or bruising  Shortness of breath  Heartburn  Uncommon (< 1%)  Blood clots  A slow heart rhythm (requiring a permanent pacemaker)  Perforation of the heart muscle, blood vessel, or lung (may require an emergency procedure)  Stroke  Damage to a heart valve   Heart attack, also known as acute myocardial infarction, or AMI   Death (extremely rare)    Before your Catheter Ablation  Before your catheter ablation, you will meet with the Electrophysiologist (specially trained heart doctor) who will do the procedure. The provider or a registered nurse will provide you with detailed instructions on how to prepare for this procedure, some of these instructions are listed below.  You will likely be told to stop or  change your heart rhythm medications for a period of time before your ablation.   You may have testing done several days prior to your ablation or the morning of your ablation, such as an ECG, x-ray, blood tests, or echocardiogram.   You will not be allowed to eat or drink 8 hours before your ablation. You will be given further instructions by your physician or a registered nurse regarding the medication you will take the morning of your procedure.  You will need to arrange to have a  home from the hospital; you will not be permitted to drive after your procedure due to the sedation that you receive.   You are allowed to bring personal items and clothing to the hospital, but please refrain from bringing any valuables as the hospital is not responsible for any lost or stolen items.  You will need to bring a list of the names and dosages of the medication you are taking to the hospital.  It is important to mention to your doctor or registered nurse if you have any allergies, reactions to anesthesia, or have had history of bleeding problems.    Arriving at the Hospital the morning of your Catheter Ablation  Please check in at the time that was given to you by the , who scheduled your procedure. You do not need to arrive any earlier than the time that you were given.    When you arrive, you will be directed to the area where they will be performing your procedure. The doctor or registered nurse will meet with you prior to your ablation, this is a good time to ask questions and address any concerns you may have. You will then be asked to sign the consent form for your ablation, if this has not already been done.    The nursing staff will begin to prepare you for your procedure:  A nurse will shave and cleanse the area where the ablation catheters will be placed. These areas are most commonly the left and right groin sites (the fold between your thigh and abdomen), and in some cases the chest, arm, and  neck. This is done to reduce the risk of infection.    The nursing staff will start an intravenous (IV) line into a vein in your arm, which allows the staff to give you medication and sedatives to help you relax prior and during your ablation.  In some cases, the nursing staff will need to place a catheter that will drain urine from your bladder (Bustillos Catheter), which is required due to the length and complexity of the ablation you are having.    After Catheter Ablation  Recovery immediately after your ablation in the hospital  After your catheter ablation procedure, you will be taken to a recovery room. After your ablation, you may be required to lay flat or be on bed rest. During this time, a nurse will monitor you, and you will be given medication to make you comfortable.     Going Home  When it is time to go home, your will need to have an adult family member or friend drive you. Most people can walk, climb stairs, and perform light activity soon after catheter ablation. You can most likely return to your full routine within a few days. However, you may be told to avoid running, heavy lifting, and other strenuous activities for a short time. Please make sure to follow any specific activity restrictions provided by the medical staff at the time of your discharge from the hospital.  Doctor's typically advise that you not drive until your post procedure assessment visit.   Avoid heavy physical activity and heavy lifting for several days after the procedure to allow your body to heal.  Ask your doctor when you can expect to return to work.  Take your temperature and check your incision for signs of infection (redness, swelling, drainage, or warmth) every day for a week. It is normal to have a small bruise or lump where the catheter was inserted.  Take your medications exactly as directed. Do not skip doses or stop medication without consulting your physician prior.  Learn to take your own pulse and keep a record of  your results.    Follow-Up  After your ablations you will have a follow up visit to see how you are doing, to assess your rhythm after your ablation, and to address any medication changes if necessary. In many cases, one ablation is enough to treat an arrhythmia. However, sometimes the problem returns or another is found. If this happens, you may need a second catheter ablation. Tell your healthcare provider if you have any new or returning symptoms.    Common Symptoms after Catheter Ablation  In the first few weeks after catheter ablation, you may feel mild chest fullness or aching. You may also feel as if your heart is skipping beats or your heartbeat may feel faster than normal. You may think that your heart rhythm problem is about to return. These sensations are normal and usually go away with time. Talk to your healthcare provider if you are concerned.      When to Call Your Doctor  Increased bleeding, bruising, or pain at the insertion site  Episodes of atrial fibrillation are common post procedure, call the clinic if episodes are lasting longer than 4-6 hours  Difficulty with your speech or walking, or any visual disturbance  Lightheaded, dizziness, or feeling faint  Shortness of breath or chest pain  Coldness, swelling, or numbness of the arm or leg near the insertion site  A bruise or lump at the insertion site that is larger than a walnut  A fever over 100 F         Electrophysiology  Discharge Instructions for General Ablations    Call the [facility] Phys[facility] [phone] if you have:     A fast heart rate that does not stop on its own   A temperature   Chest pain   Problems breathing   Lightheadedness, or feel like you may pass out   Dizziness   Numbness, tingling or pain in your legs or arms    Check your sites (groin and or collarbone), contact the [facility] at [phone] should you develop:     bleeding   bruising    drainage    redness   warmth     swelling     A small pea size lump is normal in the surgery sites.  It may take several weeks for this to go away.    Should bleeding occur:     Lie down on a firm surface   Hold pressure over the puncture site for 15 minutes   If bleeding does not stop, call 91    Common Symptoms after surgery, which may last a few days:     Fatigue   Stiff and sore joints    Sore lower back   Tenderness in the groins or under the collarbone   Skips or missed heart beats     Please follow the following restrictions:     Avoid any hard work or tiring activities for 3 days   No aggressive exercise for 3 days   No yard work such as raking, mowing the lawn, shoveling snow or snowplowing for 3 days   No jogging, biking, or sexual activity for 3 days   No heaving lifting greater than 10 pounds for 3 days   You may shower the day after your procedure   You may use a hot tub or swim 5 days after your procedure   Keep the surgical sites clean and dry until well healed   No driving for 3 days      For the next two days, whenever you cough, sneeze, laugh or have to strain (like when having a bowel movement), place firm pressure on your access sites.    Your Procedural Physician was: Dr. Robel Sam   To reach the Electrophysiology Registered Nurses working with Dr Sam please call [phone] m [facility]:  [phone]  If you are calling after hours, please listen to the entire voicemail, a live  will answer at the end of the message.

## 2023-03-29 NOTE — ANESTHESIA POSTPROCEDURE EVALUATION
Patient: Justin Greene    Procedure: Procedure(s):  Ablation Atrial Fibrillation       Anesthesia Type:  General    Note:  Disposition: Outpatient   Postop Pain Control: Uneventful            Sign Out: Well controlled pain   PONV: No   Neuro/Psych: Uneventful            Sign Out: Acceptable/Baseline neuro status   Airway/Respiratory: Uneventful            Sign Out: Acceptable/Baseline resp. status   CV/Hemodynamics: Uneventful            Sign Out: Acceptable CV status; No obvious hypovolemia; No obvious fluid overload   Other NRE: NONE   DID A NON-ROUTINE EVENT OCCUR? No           Last vitals:  Vitals Value Taken Time   /63 03/29/23 1130   Temp 36.6  C (97.9  F) 03/29/23 1004   Pulse 67 03/29/23 1143   Resp 9 03/29/23 1143   SpO2 95 % 03/29/23 1143   Vitals shown include unvalidated device data.    Electronically Signed By: Judie Rodriguez MD  March 29, 2023  11:45 AM

## 2023-03-29 NOTE — ANESTHESIA PREPROCEDURE EVALUATION
Anesthesia Pre-Procedure Evaluation    Patient: Justin Greene   MRN: [id number] : 1950        Procedure : Procedure(s):  Ablation Atrial Fibrillation          Past Medical History:   Diagnosis Date     Dyslipidemia     Created by Conversion      Moderate obstructive sleep apnea 10/30/2013    PSG: 10/29/13 - RDI 24.7, AHI 18.8, REM RDI 19.8, and low oxygen 83%. Titration: 16 - Set 8 cmH20 optimal. 20: AHI at goal at 1.3. Good compliance. Set 10 cmH20.     Paroxysmal atrial fibrillation (H)       Past Surgical History:   Procedure Laterality Date     SPINE SURGERY N/A       No Known Allergies   Social History     Tobacco Use     Smoking status: Never     Smokeless tobacco: Never   Substance Use Topics     Alcohol use: Never      Wt Readings from Last 1 Encounters:   23 86.5 kg (190 lb 9.6 oz)        Anesthesia Evaluation   Pt has had prior anesthetic.     No history of anesthetic complications       ROS/MED HX  ENT/Pulmonary:     (+) sleep apnea,     Neurologic:       Cardiovascular:     (+) --CAD ---dysrhythmias,     METS/Exercise Tolerance:     Hematologic:       Musculoskeletal:       GI/Hepatic:       Renal/Genitourinary:       Endo:       Psychiatric/Substance Use:       Infectious Disease:       Malignancy:       Other:            Physical Exam    Airway        Mallampati: I    Neck ROM: full     Respiratory Devices and Support         Dental       (+) Minor Abnormalities - some fillings, tiny chips      Cardiovascular          Rhythm and rate: irregular     Pulmonary   pulmonary exam normal                OUTSIDE LABS:  CBC:   Lab Results   Component Value Date    WBC 4.2 2023    HGB 16.3 2023    HCT 48.2 2023     2023     BMP:   Lab Results   Component Value Date     2023    POTASSIUM 4.4 2023    CHLORIDE 107 2023    CO2 25 2023    BUN 24.8 (H) 2023    CR 1.27 (H) 2023     (H) 2023     COAGS: No results  found for: PTT, INR, FIBR  POC: No results found for: BGM, HCG, HCGS  HEPATIC: No results found for: ALBUMIN, PROTTOTAL, ALT, AST, GGT, ALKPHOS, BILITOTAL, BILIDIRECT, JAMEL  OTHER:   Lab Results   Component Value Date    GERALDO 9.4 03/23/2023       Anesthesia Plan    ASA Status:  3      Anesthesia Type: General.     - Airway: ETT              Consents    Anesthesia Plan(s) and associated risks, benefits, and realistic alternatives discussed. Questions answered and patient/representative(s) expressed understanding.     - Discussed: Risks, Benefits and Alternatives for the PROCEDURE were discussed     - Discussed with:  Patient      - Extended Intubation/Ventilatory Support Discussed: No.      - Patient is DNR/DNI Status: No    Use of blood products discussed: No .     Postoperative Care    Pain management: Multi-modal analgesia.   PONV prophylaxis: Ondansetron (or other 5HT-3), Dexamethasone or Solumedrol     Comments:                Judie Rodriguez MD

## 2023-03-29 NOTE — PLAN OF CARE
Goal Outcome Evaluation:             Pt admitted for ablation due to his hx of atrial fibrillation. Pt is on Xarelto and has not missed any doses. Pt prepped and ready for procedure. Wife Stephen Stokes is here for support.        Sveta Ibarra RN

## 2023-03-29 NOTE — ANESTHESIA CARE TRANSFER NOTE
Patient: Justin Greene    Procedure: Procedure(s):  Ablation Atrial Fibrillation       Diagnosis: atrial fibrillation  Diagnosis Additional Information: No value filed.    Anesthesia Type:   General     Note:    Oropharynx: oropharynx clear of all foreign objects and spontaneously breathing  Level of Consciousness: awake and drowsy  Oxygen Supplementation: face mask  Level of Supplemental Oxygen (L/min / FiO2): 5  Independent Airway: airway patency satisfactory and stable  Dentition: dentition unchanged  Vital Signs Stable: post-procedure vital signs reviewed and stable  Report to RN Given: handoff report given  Patient transferred to: [facility]          Vitals:  Vitals Value Taken Time   /85 03/29/23 1004   Temp 36.6  C (97.9  F) 03/29/23 1004   Pulse 74 03/29/23 1004   Resp 16 03/29/23 1004   SpO2 96 % 03/29/23 1004       Electronically Signed By: SILVINA Isbell CRNA  March 29, 2023  10:04 AM

## 2023-03-29 NOTE — Clinical Note
Removed and manual pressure held until hemostasis obtained, removed with figure 8 stitch and stop cock closure

## 2023-03-29 NOTE — ANESTHESIA PROCEDURE NOTES
Airway       Patient location during procedure: OR       Procedure Start/Stop Times: 3/29/2023 7:13 AM  Staff -        Anesthesiologist:  Judie Rodriguez MD       CRNA: Antonella Vicente APRN CRNA       Performed By: CRNA  Consent for Airway        Urgency: elective  Indications and Patient Condition       Indications for airway management: lorenza-procedural       Induction type:intravenous       Mask difficulty assessment: 2 - vent by mask + OA or adjuvant +/- NMBA    Final Airway Details       Final airway type: endotracheal airway       Successful airway: ETT - single  Endotracheal Airway Details        ETT size (mm): 7.5       Cuffed: yes       Successful intubation technique: direct laryngoscopy       DL Blade Type: Wyatt 2       Grade View of Cords: 2       Adjucts: stylet       Position: Right       Measured from: gums/teeth       Secured at (cm): 23       Bite block used: None    Post intubation assessment        Placement verified by: capnometry, equal breath sounds and chest rise        Number of attempts at approach: 1       Secured with: silk tape       Ease of procedure: easy       Dentition: Intact and Unchanged       Dental guard used and removed. Dental Guard Type: Proguard Red.    Medication(s) Administered   Medication Administration Time: 3/29/2023 7:13 AM

## 2023-03-31 ENCOUNTER — VIRTUAL VISIT (OUTPATIENT)
Dept: CARDIOLOGY | Facility: CLINIC | Age: 73
End: 2023-03-31
Payer: COMMERCIAL

## 2023-03-31 DIAGNOSIS — Z98.890 STATUS POST CIRCUMFERENTIAL ABLATION OF PULMONARY VEIN: Primary | ICD-10-CM

## 2023-03-31 PROCEDURE — 99207 PR NO CHARGE NURSE ONLY: CPT

## 2023-03-31 NOTE — PATIENT INSTRUCTIONS
Your anticoagulation medication Xarelto:  It is important to remain on your anticoagulation medication uninterrupted after your ablation to reduce your risk of a stroke or heart attack, do not stop this medication  Please contact me if you have any questions regarding your anticoagulation medication    Healing from your pulmonary vein ablation:  Stay well hydrated, and increase your fluid intake during this recovery period  High protein foods aide in your bodies healing process  No aggressive or aerobic activity for 7-10 days, and do not lift more than 10 pounds for 7 days   Increase your activity gradually over the next 5-10 days, working back to your normal daily activity  If you are experiencing pain at your groin sites from the procedure, we advise applying ice for 20 minute durations 3-4 times per day     Please call me if any of the following occur:  Episodes of Atrial Fibrillation lasting greater than 4 hours, or if you notice the episodes are increasing in frequency or duration  If you develop shortness of breath, dizziness, or unresolving chest pains   Changes at your groin sites including swelling, hardening, drainage, increase in bruising, or an increase in pain  If you develop a temperature greater than 100.5 degrees (especially weeks 2-5 post   Procedure)    Call 911 if you are having symptoms of a stroke; difficulty with your speech, problems walking, difficulty with balance, vision disturbances, facial drooping or numbness, and muscle weakness on one side of your body     Your follow up appointments are as follows:  You will be seen by the electrophysiologist nurse practitioner at 6 weeks after your ablation  At your 6 week appointment, it will be determined if a 3 month follow-up is needed    Sincerely,  Merlyn Brunson RN [phone]    After hours please contact the on call service at # [phone]

## 2023-03-31 NOTE — PROGRESS NOTES
Post PVI Procedural Follow Up Call    Pt is s/p PVI from 3/29/23 with Dr Sam  PC was placed to pt, spoke to pt    General Assessment:     Weight: Pt reports weight is at baseline compared to pre procedural weight    Pain: Pt denies generalized or localized pain abnormal to healing s/p     /GI: Pt denies difficulty swallowing, denies constipation, denies urinary retention/difficulty, reports no s/s of infection, report normal appetite and reports staying hydrated.    Respiratory: Pt denies SOB, denies difficulty breathing, denies throat pain, denies changes/abnormal sputum and denies any further symptoms abnormal to normal healing process s/p PVI.    Activity: Pt is tolerating advancement in activity while following physical restrictions, staying well hydrated and gradually working into baseline activity.     Rhythm Assessment:   Pt denies palpitations, denies irregularities in HR or rhythm and denies symptoms or sustained AF episodes.    Procedure Site Assessment:   Pts no visible/physical changes in groin sites, some bruising around sites without significant change from hospital discharge and normal to PVI recovery and small pea/dime size hardening under suture sites normal to PVI recovery    Anticoagulation/Medication:  Pt remain on Xarelto without interruption  Pt confirms taking ASA 81mg Daily, and will continue taking this for 1 mo s/p    Education completed with pt at this visit:  Reviewed normal post-op PVI healing process, when to contact EP-RN/EP-MD, contact information was given to the pt for further concerns or questions and pt verbalized understanding    Follow up  Pts AVS was printed and mailed to pt by scheduling team, pt will be seen by EP NP in 4-6 wks, monitor will be ordered at this follow-up if indicated and 3mo follow-up and monitor will be determined at 6wk follow-up by EP NP    3/31/2023 9:10 AM  Merlyn Brunson RN

## 2023-05-11 ENCOUNTER — OFFICE VISIT (OUTPATIENT)
Dept: CARDIOLOGY | Facility: CLINIC | Age: 73
End: 2023-05-11
Payer: COMMERCIAL

## 2023-05-11 VITALS
WEIGHT: 191 LBS | HEART RATE: 70 BPM | OXYGEN SATURATION: 97 % | RESPIRATION RATE: 18 BRPM | DIASTOLIC BLOOD PRESSURE: 70 MMHG | BODY MASS INDEX: 27.41 KG/M2 | SYSTOLIC BLOOD PRESSURE: 112 MMHG

## 2023-05-11 DIAGNOSIS — Z98.890 S/P ABLATION OF ATRIAL FIBRILLATION: ICD-10-CM

## 2023-05-11 DIAGNOSIS — I48.0 PAROXYSMAL ATRIAL FIBRILLATION (H): Primary | ICD-10-CM

## 2023-05-11 DIAGNOSIS — Z86.79 S/P ABLATION OF ATRIAL FIBRILLATION: ICD-10-CM

## 2023-05-11 DIAGNOSIS — G47.33 MODERATE OBSTRUCTIVE SLEEP APNEA: ICD-10-CM

## 2023-05-11 DIAGNOSIS — I25.10 CORONARY ARTERY CALCIFICATION: ICD-10-CM

## 2023-05-11 PROCEDURE — 99214 OFFICE O/P EST MOD 30 MIN: CPT | Performed by: NURSE PRACTITIONER

## 2023-05-11 NOTE — LETTER
5/11/2023    Yasmani Rubalcavasel  [facility] [address]  [facility] [address]    RE: Justin Greene       Dear Colleague,     I had the pleasure of seeing Justin Greene in the [facility].    HEART CARE ELECTROPHYSIOLOGY NOTE      [facility]  [phone]      Assessment/Recommendations   Assessment/Plan:  1.  Persistent Atrial Fibrillation: No symptomatology or evidence of AF recurrence.  He remains off membrane active antiarrhythmic medications.  He has had an uneventful recovery from ablation with no emergency department or unscheduled clinic visits.  MCOT for 2 weeks to evaluate for recurrent arrhythmia.    He was reassured that atrial fibrillation is not life-threatening, but carries an increased risk for stroke.  He has a [id number] score of 1-2 for age 65-74 and coronary artery calcification seen on CT, though negative stress test.  We discussed risk for stroke versus risk for bleed on OAC.  Continue Xarelto 20 mg daily for stroke prophylaxis in preparation for ablation.  We discussed the need to continue OAC for a minimum of 3 months post ablation.    2.  Coronary artery calcification seen on CT: Stress test in 2021 was negative for ischemia.  Denies anginal symptoms.  Continue statin.    3.  Obstructive sleep apnea: Consistent use of CPAP nightly.  Discussed correlation between untreated sleep apnea and atrial arrhythmias.  He was encouraged to maintain compliance with therapy.    Follow up 3 months post ablation     History of Present Illness/Subjective    HPI: Justin Greene is a 72 year old male who comes in today accompanied by his wife for EP follow-up of atrial fibrillation.  He has a history of atrial fibrillation, sinus node dysfunction, hyperlipidemia, and LISET on CPAP.  Chest CT from 2016 notes coronary artery calcifications, stress test done in 2021 was negative for ischemia.    He was diagnosed with atrial fibrillation in the summer 2021  undergoing cardioversion in August 2021, though symptom onset in July.  Symptoms consist of fatigue, decreased activity tolerance, and orthostatic lightheadedness.  He was started on sotalol in September 2021.  He was hospitalized in August 2022 for atrial fibrillation with slow ventricular response due to possible complete heart block and Lyme's disease/Anaplasma treated with doxycycline.  Initial EKG showed atrial fibrillation with a ventricular response in the 40s and 50s with a regular wide QRS complex (LBBB morphology) suggestive of complete heart block with ventricular escape rhythm.  AV conduction returned to normal with discontinuation of sotalol and he spontaneously converted back to sinus rhythm.  He has remained off membrane active antiarrhythmic medications with improved heart rates/chronotropic response to activity.  He was started on flecainide and metoprolol tartrate at onset of A-fib.  He underwent ablation by Dr. Sam on 3/29/2023 with RF to all 4 pulmonary veins.  Left atrial mapping showed normal voltage throughout.  He is on Xarelto for stroke prophylaxis..    Gino states that he feels well.  He has been active, though states he has less stamina than previous.  He had a bout of gastroenteritis and an upper respiratory infection the week after ablation.  He has not had any further episodes of A-fib and his Apple Watch has documented only sinus rhythm.  He reports an uneventful recovery from ablation with no significant groin site issues, heartburn, difficulty swallowing, or neurologic changes.  He denies chest discomfort, palpitations, abdominal fullness/bloating or peripheral edema, shortness of breath, paroxysmal nocturnal dyspnea, orthopnea, lightheadedness, dizziness, pre-syncope, or syncope.  He no longer drinks any alcohol, minimal caffeine, and drinks lots of water daily.    Cardiographics (EKGs personally reviewed):  EKG done 3/29/2023 shows sinus rhythm at 71 bpm, first-degree AV delay,  QRS 90 ms, QT/QTc 360/391 ms  EKG done 3/23/2023 shows sinus bradycardia at 55 bpm, first-degree AV delay,  ms, QT/QTc 408/390 ms  EKG done 8/11/2022 states sinus bradycardia at 55 bpm,  ms, QT/QTc 448/428 ms  EKG done 8/4/2022 states atrial fibrillation with controlled ventricular rate of 64 bpm    ECHO done 8/6/2022:   1. Bradyarrhythmia during study.     2. The left ventricular size is normal.  Systolic function is normal.     The   estimated ejection fraction is 65%.  Wall thickness is borderline   increased.   Left ventricular segmental wall motion is grossly normal, however   endocardial   definition is limited.     3. The right ventricular size is borderline mildly enlarged.  Systolic   function is normal.     4. The left atrium is mildly enlarged.     5. The right atrium is mildly enlarged.     6. There is mild mitral valve regurgitation.     7. There is mild tricuspid valve regurgitation.     8. There is trace pulmonic regurgitation.     9. Aorta is normal in dimension proximally.     10. There is no gross pericardial effusion.     NM exercise stress test done 7/20/2021:    1. Treadmill Myocard Perf Multiple SPECT w/ or w/o wall motion \T\ EF   [[id number]].     2. Resting SPECT images and Stress Gated SPECT images obtained post   Sestamibi injection.     3. The patient exercised for 11 minutes and 0 seconds on the  Hunter   protocol.     4. The patient experienced no symptoms during the stress test.     5. Excellent functional capacity for age.     6. Normal Hemodynamic response to stress.     7. Occasional PVCs during stress and rare Couplets noted.     8. Negative stress ECG for significant ST segment depression.     9. Stress and rest images show count reduction inferiorly consistent with   diaphragmatic attenuation.     10. Perfusion images reveal no significant territory of myocardial   infarct   or ischemia.     11. Normal left ventricular systolic function. Calculated LVEF 65 %.     I  have reviewed and updated the patient's Past Medical History, Social History, Family History and Medication List.  Outside records personally reviewed.     Physical Examination  Review of Systems   Vitals: /70 (BP Location: Right arm, Patient Position: Sitting, Cuff Size: Adult Large)   Pulse 70   Resp 18   Wt 86.6 kg (191 lb)   SpO2 97%   BMI 27.41 kg/m    BMI= Body mass index is 27.41 kg/m .  Wt Readings from Last 3 Encounters:   05/11/23 86.6 kg (191 lb)   03/29/23 86.5 kg (190 lb 9.6 oz)   03/23/23 88.5 kg (195 lb)       General Appearance:   Alert, well-appearing and in no acute distress.   HEENT: Atraumatic, normocephalic.  No scleral icterus, normal conjunctivae, EOMs intact, PERRL.  Wearing a mask.   Chest/Lungs:   Chest symmetric, spine straight.  Respirations unlabored.  Lungs are clear to auscultation.   Cardiovascular:   Regular rate and rhythm.  Normal first and second heart sounds with no murmurs, rubs, or gallops; radial and posterior tibial pulses are intact, No edema.   Abdomen:  Soft, nondistended, bowel sounds present.   Extremities: No cyanosis or clubbing.   Musculoskeletal: Moves all extremities.    Skin: Warm, dry, intact.    Neurologic: Mood and affect are appropriate.  Alert and oriented to person, place, time, and situation.     ROS: 10 point ROS neg other than the symptoms noted above in the HPI.         Medical History  Surgical History Family History Social History   Past Medical History:   Diagnosis Date    Dyslipidemia     Created by Conversion     Moderate obstructive sleep apnea 10/30/2013    PSG: 10/29/13 - RDI 24.7, AHI 18.8, REM RDI 19.8, and low oxygen 83%. Titration: 6/2/16 - Set 8 cmH20 optimal. 9/8/20: AHI at goal at 1.3. Good compliance. Set 10 cmH20.    Paroxysmal atrial fibrillation (H)      Past Surgical History:   Procedure Laterality Date    EP ABLATION PULMONARY VEIN ISOLATION N/A 3/29/2023    Procedure: Ablation Atrial Fibrillation;  Surgeon: Robel Sam,  MD;  Location: [facility] CV    SPINE SURGERY N/A      Family History   Problem Relation Age of Onset    Lung Cancer Mother     Cerebrovascular Disease Father     Myocardial Infarction Father     Prostate Cancer Father     Thyroid Cancer Sister     No Known Problems Sister     Sudden Death Brother     Hypertension Brother     Congenital heart disease Brother     Coronary Artery Disease Brother     Prader-Willi syndrome Brother     No Known Problems Brother         Social History     Socioeconomic History    Marital status:      Spouse name: Not on file    Number of children: Not on file    Years of education: Not on file    Highest education level: Not on file   Occupational History    Not on file   Tobacco Use    Smoking status: Never    Smokeless tobacco: Never   Vaping Use    Vaping status: Not on file   Substance and Sexual Activity    Alcohol use: Never    Drug use: Never    Sexual activity: Not on file   Other Topics Concern    Not on file   Social History Narrative    Not on file     Social Determinants of Health     Financial Resource Strain: Not on file   Food Insecurity: Not on file   Transportation Needs: Not on file   Physical Activity: Not on file   Stress: Not on file   Social Connections: Not on file   Intimate Partner Violence: Not on file   Housing Stability: Not on file           Medications  Allergies   Current Outpatient Medications   Medication Sig Dispense Refill    atorvastatin (LIPITOR) 40 MG tablet Take 40 mg by mouth daily      cholecalciferol 50 MCG (2000 UT) tablet Take 4,000 Units by mouth      Melatonin-Pyridoxine (MELATONIN-B6 OR)       rivaroxaban ANTICOAGULANT (XARELTO) 20 MG TABS tablet Take 1 tablet (20 mg) by mouth daily (with dinner) 90 tablet 3    vitamin E (TOCOPHEROL) 1000 units (450 mg) CAPS capsule Take 1,000 Units by mouth daily       No Known Allergies       Lab Results    Chemistry/lipid CBC Cardiac Enzymes/BNP/TSH/INR   Recent Labs   Lab Test 03/23/23  1511       POTASSIUM 4.4   CHLORIDE 107   CO2 25   ANIONGAP 8   *   BUN 24.8*   CR 1.27*   GERALDO 9.4      CBC RESULTS: Recent Labs   Lab Test 03/23/23  1511   WBC 4.2   RBC 5.18   HGB 16.3   HCT 48.2   MCV 93   MCH 31.5   MCHC 33.8   RDW 12.5                      Thank you for allowing me to participate in the care of your patient.      Sincerely,     SILVINA [facility]  cc: No referring provider defined for this encounter.

## 2023-05-11 NOTE — PATIENT INSTRUCTIONS
Justin Greene,    It was a pleasure to see you today at the [facility].     My recommendations after this visit include:    Continue Xarelto 20 mg daily for at least 3 months after ablation.  Will discuss further at your next appointment.    Wear heart monitor for 2 weeks    Follow up in 6 weeks    Tessy Marsh CNP  [facility], Electrophysiology  [phone]  EP nurses [phone]

## 2023-05-11 NOTE — PROGRESS NOTES
HEART CARE ELECTROPHYSIOLOGY NOTE      [facility]  [phone]      Assessment/Recommendations   Assessment/Plan:  1.  Persistent Atrial Fibrillation: No symptomatology or evidence of AF recurrence.  He remains off membrane active antiarrhythmic medications.  He has had an uneventful recovery from ablation with no emergency department or unscheduled clinic visits.  MCOT for 2 weeks to evaluate for recurrent arrhythmia.    He was reassured that atrial fibrillation is not life-threatening, but carries an increased risk for stroke.  He has a [id number] score of 1-2 for age 65-74 and coronary artery calcification seen on CT, though negative stress test.  We discussed risk for stroke versus risk for bleed on OAC.  Continue Xarelto 20 mg daily for stroke prophylaxis in preparation for ablation.  We discussed the need to continue OAC for a minimum of 3 months post ablation.    2.  Coronary artery calcification seen on CT: Stress test in 2021 was negative for ischemia.  Denies anginal symptoms.  Continue statin.    3.  Obstructive sleep apnea: Consistent use of CPAP nightly.  Discussed correlation between untreated sleep apnea and atrial arrhythmias.  He was encouraged to maintain compliance with therapy.    Follow up 3 months post ablation     History of Present Illness/Subjective    HPI: Justin Greene is a 72 year old male who comes in today accompanied by his wife for EP follow-up of atrial fibrillation.  He has a history of atrial fibrillation, sinus node dysfunction, hyperlipidemia, and LISET on CPAP.  Chest CT from 2016 notes coronary artery calcifications, stress test done in 2021 was negative for ischemia.    He was diagnosed with atrial fibrillation in the summer 2021 undergoing cardioversion in August 2021, though symptom onset in July.  Symptoms consist of fatigue, decreased activity tolerance, and orthostatic lightheadedness.  He was started on sotalol in September 2021.  He was  hospitalized in August 2022 for atrial fibrillation with slow ventricular response due to possible complete heart block and Lyme's disease/Anaplasma treated with doxycycline.  Initial EKG showed atrial fibrillation with a ventricular response in the 40s and 50s with a regular wide QRS complex (LBBB morphology) suggestive of complete heart block with ventricular escape rhythm.  AV conduction returned to normal with discontinuation of sotalol and he spontaneously converted back to sinus rhythm.  He has remained off membrane active antiarrhythmic medications with improved heart rates/chronotropic response to activity.  He was started on flecainide and metoprolol tartrate at onset of A-fib.  He underwent ablation by Dr. Sam on 3/29/2023 with RF to all 4 pulmonary veins.  Left atrial mapping showed normal voltage throughout.  He is on Xarelto for stroke prophylaxis..    Gino states that he feels well.  He has been active, though states he has less stamina than previous.  He had a bout of gastroenteritis and an upper respiratory infection the week after ablation.  He has not had any further episodes of A-fib and his Apple Watch has documented only sinus rhythm.  He reports an uneventful recovery from ablation with no significant groin site issues, heartburn, difficulty swallowing, or neurologic changes.  He denies chest discomfort, palpitations, abdominal fullness/bloating or peripheral edema, shortness of breath, paroxysmal nocturnal dyspnea, orthopnea, lightheadedness, dizziness, pre-syncope, or syncope.  He no longer drinks any alcohol, minimal caffeine, and drinks lots of water daily.    Cardiographics (EKGs personally reviewed):  EKG done 3/29/2023 shows sinus rhythm at 71 bpm, first-degree AV delay, QRS 90 ms, QT/QTc 360/391 ms  EKG done 3/23/2023 shows sinus bradycardia at 55 bpm, first-degree AV delay,  ms, QT/QTc 408/390 ms  EKG done 8/11/2022 states sinus bradycardia at 55 bpm,  ms, QT/QTc  448/428 ms  EKG done 8/4/2022 states atrial fibrillation with controlled ventricular rate of 64 bpm    ECHO done 8/6/2022:   1. Bradyarrhythmia during study.     2. The left ventricular size is normal.  Systolic function is normal.     The   estimated ejection fraction is 65%.  Wall thickness is borderline   increased.   Left ventricular segmental wall motion is grossly normal, however   endocardial   definition is limited.     3. The right ventricular size is borderline mildly enlarged.  Systolic   function is normal.     4. The left atrium is mildly enlarged.     5. The right atrium is mildly enlarged.     6. There is mild mitral valve regurgitation.     7. There is mild tricuspid valve regurgitation.     8. There is trace pulmonic regurgitation.     9. Aorta is normal in dimension proximally.     10. There is no gross pericardial effusion.     NM exercise stress test done 7/20/2021:    1. Treadmill Myocard Perf Multiple SPECT w/ or w/o wall motion \T\ EF   [[id number]].     2. Resting SPECT images and Stress Gated SPECT images obtained post   Sestamibi injection.     3. The patient exercised for 11 minutes and 0 seconds on the  Hunter   protocol.     4. The patient experienced no symptoms during the stress test.     5. Excellent functional capacity for age.     6. Normal Hemodynamic response to stress.     7. Occasional PVCs during stress and rare Couplets noted.     8. Negative stress ECG for significant ST segment depression.     9. Stress and rest images show count reduction inferiorly consistent with   diaphragmatic attenuation.     10. Perfusion images reveal no significant territory of myocardial   infarct   or ischemia.     11. Normal left ventricular systolic function. Calculated LVEF 65 %.     I have reviewed and updated the patient's Past Medical History, Social History, Family History and Medication List.  Outside records personally reviewed.     Physical Examination  Review of Systems   Vitals: /70  (BP Location: Right arm, Patient Position: Sitting, Cuff Size: Adult Large)   Pulse 70   Resp 18   Wt 86.6 kg (191 lb)   SpO2 97%   BMI 27.41 kg/m    BMI= Body mass index is 27.41 kg/m .  Wt Readings from Last 3 Encounters:   05/11/23 86.6 kg (191 lb)   03/29/23 86.5 kg (190 lb 9.6 oz)   03/23/23 88.5 kg (195 lb)       General Appearance:   Alert, well-appearing and in no acute distress.   HEENT: Atraumatic, normocephalic.  No scleral icterus, normal conjunctivae, EOMs intact, PERRL.  Wearing a mask.   Chest/Lungs:   Chest symmetric, spine straight.  Respirations unlabored.  Lungs are clear to auscultation.   Cardiovascular:   Regular rate and rhythm.  Normal first and second heart sounds with no murmurs, rubs, or gallops; radial and posterior tibial pulses are intact, No edema.   Abdomen:  Soft, nondistended, bowel sounds present.   Extremities: No cyanosis or clubbing.   Musculoskeletal: Moves all extremities.    Skin: Warm, dry, intact.    Neurologic: Mood and affect are appropriate.  Alert and oriented to person, place, time, and situation.     ROS: 10 point ROS neg other than the symptoms noted above in the HPI.         Medical History  Surgical History Family History Social History   Past Medical History:   Diagnosis Date     Dyslipidemia     Created by Conversion      Moderate obstructive sleep apnea 10/30/2013    PSG: 10/29/13 - RDI 24.7, AHI 18.8, REM RDI 19.8, and low oxygen 83%. Titration: 6/2/16 - Set 8 cmH20 optimal. 9/8/20: AHI at goal at 1.3. Good compliance. Set 10 cmH20.     Paroxysmal atrial fibrillation (H)      Past Surgical History:   Procedure Laterality Date     EP ABLATION PULMONARY VEIN ISOLATION N/A 3/29/2023    Procedure: Ablation Atrial Fibrillation;  Surgeon: Robel Sam MD;  Location: [facility] CV     SPINE SURGERY N/A      Family History   Problem Relation Age of Onset     Lung Cancer Mother      Cerebrovascular Disease Father      Myocardial Infarction Father       Prostate Cancer Father      Thyroid Cancer Sister      No Known Problems Sister      Sudden Death Brother      Hypertension Brother      Congenital heart disease Brother      Coronary Artery Disease Brother      Prader-Willi syndrome Brother      No Known Problems Brother         Social History     Socioeconomic History     Marital status:      Spouse name: Not on file     Number of children: Not on file     Years of education: Not on file     Highest education level: Not on file   Occupational History     Not on file   Tobacco Use     Smoking status: Never     Smokeless tobacco: Never   Vaping Use     Vaping status: Not on file   Substance and Sexual Activity     Alcohol use: Never     Drug use: Never     Sexual activity: Not on file   Other Topics Concern     Not on file   Social History Narrative     Not on file     Social Determinants of Health     Financial Resource Strain: Not on file   Food Insecurity: Not on file   Transportation Needs: Not on file   Physical Activity: Not on file   Stress: Not on file   Social Connections: Not on file   Intimate Partner Violence: Not on file   Housing Stability: Not on file           Medications  Allergies   Current Outpatient Medications   Medication Sig Dispense Refill     atorvastatin (LIPITOR) 40 MG tablet Take 40 mg by mouth daily       cholecalciferol 50 MCG (2000 UT) tablet Take 4,000 Units by mouth       Melatonin-Pyridoxine (MELATONIN-B6 OR)        rivaroxaban ANTICOAGULANT (XARELTO) 20 MG TABS tablet Take 1 tablet (20 mg) by mouth daily (with dinner) 90 tablet 3     vitamin E (TOCOPHEROL) 1000 units (450 mg) CAPS capsule Take 1,000 Units by mouth daily       No Known Allergies       Lab Results    Chemistry/lipid CBC Cardiac Enzymes/BNP/TSH/INR   Recent Labs   Lab Test 03/23/23  1511      POTASSIUM 4.4   CHLORIDE 107   CO2 25   ANIONGAP 8   *   BUN 24.8*   CR 1.27*   GERALDO 9.4      CBC RESULTS: Recent Labs   Lab Test 03/23/23  1511   WBC 4.2    RBC 5.18   HGB 16.3   HCT 48.2   MCV 93   MCH 31.5   MCHC 33.8   RDW 12.5

## 2023-05-16 ENCOUNTER — HOSPITAL ENCOUNTER (OUTPATIENT)
Dept: CARDIOLOGY | Facility: HOSPITAL | Age: 73
Discharge: HOME | End: 2023-05-16
Attending: NURSE PRACTITIONER
Payer: COMMERCIAL

## 2023-05-16 DIAGNOSIS — I48.0 PAROXYSMAL ATRIAL FIBRILLATION (H): ICD-10-CM

## 2023-05-16 DIAGNOSIS — Z86.79 S/P ABLATION OF ATRIAL FIBRILLATION: ICD-10-CM

## 2023-05-16 DIAGNOSIS — Z98.890 S/P ABLATION OF ATRIAL FIBRILLATION: ICD-10-CM

## 2023-05-16 PROCEDURE — 999N000096 CARDIAC MOBILE TELEMETRY MONITOR

## 2023-05-31 PROCEDURE — 93228 REMOTE 30 DAY ECG REV/REPORT: CPT | Performed by: INTERNAL MEDICINE

## 2023-06-20 ENCOUNTER — OFFICE VISIT (OUTPATIENT)
Dept: CARDIOLOGY | Facility: CLINIC | Age: 73
End: 2023-06-20
Attending: NURSE PRACTITIONER
Payer: COMMERCIAL

## 2023-06-20 VITALS
HEART RATE: 65 BPM | HEIGHT: 70 IN | DIASTOLIC BLOOD PRESSURE: 80 MMHG | BODY MASS INDEX: 27.2 KG/M2 | SYSTOLIC BLOOD PRESSURE: 122 MMHG | RESPIRATION RATE: 16 BRPM | WEIGHT: 190 LBS

## 2023-06-20 DIAGNOSIS — I25.10 CORONARY ARTERY CALCIFICATION: ICD-10-CM

## 2023-06-20 DIAGNOSIS — Z86.79 S/P ABLATION OF ATRIAL FIBRILLATION: ICD-10-CM

## 2023-06-20 DIAGNOSIS — Z98.890 S/P ABLATION OF ATRIAL FIBRILLATION: ICD-10-CM

## 2023-06-20 DIAGNOSIS — I48.0 PAROXYSMAL ATRIAL FIBRILLATION (H): Primary | ICD-10-CM

## 2023-06-20 PROCEDURE — 99214 OFFICE O/P EST MOD 30 MIN: CPT | Performed by: NURSE PRACTITIONER

## 2023-06-20 RX ORDER — ASPIRIN 81 MG/1
81 TABLET ORAL DAILY
COMMUNITY
Start: 2023-06-20

## 2023-06-20 RX ORDER — LANOLIN ALCOHOL/MO/W.PET/CERES
3 CREAM (GRAM) TOPICAL
COMMUNITY

## 2023-06-20 NOTE — PATIENT INSTRUCTIONS
Justin Greene,    It was a pleasure to see you today at the [facility].     My recommendations after this visit include:    Stop Xarelto on 6/29/2023 and start aspirin 81 mg daily    Check pulse/rhythm daily and with symptoms.  Call for recurrence of A fib and restart Xarelto.    Follow up in March (1 year post ablation)    Tessy Marsh CNP  [facility], Electrophysiology  [phone]  EP nurses [phone]
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
## 2024-03-09 ENCOUNTER — HEALTH MAINTENANCE LETTER (OUTPATIENT)
Age: 74
End: 2024-03-09

## 2025-03-16 ENCOUNTER — HEALTH MAINTENANCE LETTER (OUTPATIENT)
Age: 75
End: 2025-03-16

## (undated) DEVICE — KIT ENSITE SURFACE ELECTRODE ENSITE-SEK-5-01

## (undated) DEVICE — INTRO MICRO MINI STICK 4FR X 10CM STIFF H965457511

## (undated) DEVICE — TRANSDUCER W/MONITORING TRAY 42632-05

## (undated) DEVICE — INTRO SHTH BLND STRBL BIDIR W/ GW 12F STD 65CM SS120065

## (undated) DEVICE — CABLE UMBILICAL CRYOABLATION ELEC

## (undated) DEVICE — CATH ARCTIC FRONT ADVANCE 2AF284

## (undated) DEVICE — CATH UMBILICAL COAX CBL 203CXC

## (undated) DEVICE — CUSTOM PACK EP

## (undated) DEVICE — TUBING KIT COOL POINT

## (undated) DEVICE — GUIDEWIRE JTIP 3MM .035 180CM IQ35F180J3

## (undated) DEVICE — WIRE GUIDE 0.035"X260CM AMPTLAZ XSTIFF CVD THSCF-35-260-3-A

## (undated) DEVICE — Device

## (undated) DEVICE — ELECTRODE ADULT PACING MULTI P-211-M1

## (undated) DEVICE — MANIFOLD 4 GANG 504BN-R

## (undated) DEVICE — CATHETER ICE VIEWFLEX XTRA

## (undated) DEVICE — CATH EP INQUIRY DECAPOLAR 6FR X 110CM LG CRV

## (undated) DEVICE — SHEATH PINNACLE 9/25/038 RSS905

## (undated) DEVICE — CABLE BIPOLAR PACING THRESHOLD 8 WHITE

## (undated) DEVICE — INTRO SHEATH TERUMO 10FRX25CM PINNACLE RSS006

## (undated) DEVICE — CATH TRANSSEPTAL VERSACROSS 45D 63X230CM VXSK0032

## (undated) DEVICE — VALVE ROTATING HEMOSTATIC ACS 23242

## (undated) DEVICE — CATH MAPPING ADVISOR HD GRID SE D-AVHD-DF16

## (undated) RX ORDER — HEPARIN SODIUM 10000 [USP'U]/100ML
INJECTION, SOLUTION INTRAVENOUS
Status: DISPENSED
Start: 2023-03-29

## (undated) RX ORDER — FENTANYL CITRATE 50 UG/ML
INJECTION, SOLUTION INTRAMUSCULAR; INTRAVENOUS
Status: DISPENSED
Start: 2023-03-29

## (undated) RX ORDER — EPHEDRINE SULFATE 50 MG/ML
INJECTION, SOLUTION INTRAMUSCULAR; INTRAVENOUS; SUBCUTANEOUS
Status: DISPENSED
Start: 2023-03-29

## (undated) RX ORDER — PROTAMINE SULFATE 10 MG/ML
INJECTION, SOLUTION INTRAVENOUS
Status: DISPENSED
Start: 2023-03-29